# Patient Record
Sex: FEMALE | Race: OTHER | Employment: FULL TIME | URBAN - METROPOLITAN AREA
[De-identification: names, ages, dates, MRNs, and addresses within clinical notes are randomized per-mention and may not be internally consistent; named-entity substitution may affect disease eponyms.]

---

## 2018-04-26 ENCOUNTER — HOSPITAL ENCOUNTER (EMERGENCY)
Age: 23
Discharge: HOME | End: 2018-04-26
Payer: COMMERCIAL

## 2018-04-26 ENCOUNTER — APPOINTMENT (OUTPATIENT)
Dept: GENERAL RADIOLOGY | Age: 23
End: 2018-04-26
Payer: COMMERCIAL

## 2018-04-26 VITALS
OXYGEN SATURATION: 99 % | TEMPERATURE: 97.7 F | RESPIRATION RATE: 18 BRPM | HEART RATE: 66 BPM | DIASTOLIC BLOOD PRESSURE: 71 MMHG | WEIGHT: 175 LBS | SYSTOLIC BLOOD PRESSURE: 139 MMHG

## 2018-04-26 DIAGNOSIS — S02.2XXA CLOSED FRACTURE OF NASAL BONE, INITIAL ENCOUNTER: Primary | ICD-10-CM

## 2018-04-26 LAB
CHP ED QC CHECK: NORMAL
PREGNANCY TEST URINE, POC: NEGATIVE

## 2018-04-26 PROCEDURE — 99283 EMERGENCY DEPT VISIT LOW MDM: CPT

## 2018-04-26 PROCEDURE — 70160 X-RAY EXAM OF NASAL BONES: CPT

## 2018-04-26 ASSESSMENT — ENCOUNTER SYMPTOMS
BLOOD IN STOOL: 0
VOMITING: 0
DIARRHEA: 0
RHINORRHEA: 0
SHORTNESS OF BREATH: 0
NAUSEA: 0
COUGH: 0
ABDOMINAL PAIN: 0
SORE THROAT: 0
COLOR CHANGE: 0

## 2018-04-26 ASSESSMENT — PAIN SCALES - GENERAL: PAINLEVEL_OUTOF10: 7

## 2018-04-26 ASSESSMENT — PAIN DESCRIPTION - LOCATION: LOCATION: NOSE

## 2018-04-26 ASSESSMENT — PAIN DESCRIPTION - PAIN TYPE: TYPE: ACUTE PAIN

## 2018-04-26 ASSESSMENT — PAIN DESCRIPTION - DESCRIPTORS: DESCRIPTORS: ACHING;SORE

## 2018-04-26 ASSESSMENT — PAIN DESCRIPTION - FREQUENCY: FREQUENCY: INTERMITTENT

## 2019-08-04 ENCOUNTER — APPOINTMENT (OUTPATIENT)
Dept: CT IMAGING | Age: 24
End: 2019-08-04
Payer: COMMERCIAL

## 2019-08-04 ENCOUNTER — HOSPITAL ENCOUNTER (EMERGENCY)
Age: 24
Discharge: HOME | End: 2019-08-04
Payer: COMMERCIAL

## 2019-08-04 VITALS
RESPIRATION RATE: 16 BRPM | WEIGHT: 170 LBS | HEART RATE: 74 BPM | BODY MASS INDEX: 36.68 KG/M2 | TEMPERATURE: 97.9 F | OXYGEN SATURATION: 100 % | HEIGHT: 57 IN | DIASTOLIC BLOOD PRESSURE: 60 MMHG | SYSTOLIC BLOOD PRESSURE: 123 MMHG

## 2019-08-04 DIAGNOSIS — L03.316 CELLULITIS OF UMBILICUS: ICD-10-CM

## 2019-08-04 DIAGNOSIS — K80.50 BILIARY COLIC: Primary | ICD-10-CM

## 2019-08-04 LAB
ALBUMIN SERPL-MCNC: 4.1 G/DL (ref 3.5–4.6)
ALP BLD-CCNC: 75 U/L (ref 40–130)
ALT SERPL-CCNC: 10 U/L (ref 0–33)
ANION GAP SERPL CALCULATED.3IONS-SCNC: 15 MEQ/L (ref 9–15)
AST SERPL-CCNC: 13 U/L (ref 0–35)
BACTERIA: ABNORMAL /HPF
BASOPHILS ABSOLUTE: 0.1 K/UL (ref 0–0.2)
BASOPHILS RELATIVE PERCENT: 1.1 %
BILIRUB SERPL-MCNC: 0.4 MG/DL (ref 0.2–0.7)
BILIRUBIN URINE: NEGATIVE
BLOOD, URINE: NEGATIVE
BUN BLDV-MCNC: 9 MG/DL (ref 6–20)
CALCIUM SERPL-MCNC: 8.8 MG/DL (ref 8.5–9.9)
CHLORIDE BLD-SCNC: 102 MEQ/L (ref 95–107)
CHP ED QC CHECK: NORMAL
CLARITY: ABNORMAL
CO2: 23 MEQ/L (ref 20–31)
COLOR: YELLOW
CREAT SERPL-MCNC: 0.65 MG/DL (ref 0.5–0.9)
EOSINOPHILS ABSOLUTE: 0.4 K/UL (ref 0–0.7)
EOSINOPHILS RELATIVE PERCENT: 4.8 %
EPITHELIAL CELLS, UA: >100 /HPF (ref 0–5)
GFR AFRICAN AMERICAN: >60
GFR NON-AFRICAN AMERICAN: >60
GLOBULIN: 2.8 G/DL (ref 2.3–3.5)
GLUCOSE BLD-MCNC: 103 MG/DL (ref 70–99)
GLUCOSE URINE: NEGATIVE MG/DL
HCT VFR BLD CALC: 40.7 % (ref 37–47)
HEMOGLOBIN: 14.2 G/DL (ref 12–16)
KETONES, URINE: NEGATIVE MG/DL
LACTIC ACID: 1 MMOL/L (ref 0.5–2.2)
LEUKOCYTE ESTERASE, URINE: ABNORMAL
LIPASE: 23 U/L (ref 12–95)
LYMPHOCYTES ABSOLUTE: 2 K/UL (ref 1–4.8)
LYMPHOCYTES RELATIVE PERCENT: 25.7 %
MCH RBC QN AUTO: 30.3 PG (ref 27–31.3)
MCHC RBC AUTO-ENTMCNC: 35 % (ref 33–37)
MCV RBC AUTO: 86.5 FL (ref 82–100)
MONOCYTES ABSOLUTE: 0.5 K/UL (ref 0.2–0.8)
MONOCYTES RELATIVE PERCENT: 6.1 %
NEUTROPHILS ABSOLUTE: 4.8 K/UL (ref 1.4–6.5)
NEUTROPHILS RELATIVE PERCENT: 62.3 %
NITRITE, URINE: NEGATIVE
PDW BLD-RTO: 12.8 % (ref 11.5–14.5)
PH UA: 5.5 (ref 5–9)
PLATELET # BLD: 224 K/UL (ref 130–400)
POC CREATININE WHOLE BLOOD: 0.7
POTASSIUM SERPL-SCNC: 3.9 MEQ/L (ref 3.4–4.9)
PREGNANCY TEST URINE, POC: NORMAL
PROTEIN UA: NEGATIVE MG/DL
RBC # BLD: 4.7 M/UL (ref 4.2–5.4)
SODIUM BLD-SCNC: 140 MEQ/L (ref 135–144)
SPECIFIC GRAVITY UA: 1.03 (ref 1–1.03)
TOTAL PROTEIN: 6.9 G/DL (ref 6.3–8)
URINE REFLEX TO CULTURE: YES
UROBILINOGEN, URINE: 1 E.U./DL
WBC # BLD: 7.6 K/UL (ref 4.8–10.8)
WBC UA: ABNORMAL /HPF (ref 0–5)

## 2019-08-04 PROCEDURE — 6360000004 HC RX CONTRAST MEDICATION: Performed by: PHYSICIAN ASSISTANT

## 2019-08-04 PROCEDURE — 87205 SMEAR GRAM STAIN: CPT

## 2019-08-04 PROCEDURE — 87086 URINE CULTURE/COLONY COUNT: CPT

## 2019-08-04 PROCEDURE — 74177 CT ABD & PELVIS W/CONTRAST: CPT

## 2019-08-04 PROCEDURE — 81001 URINALYSIS AUTO W/SCOPE: CPT

## 2019-08-04 PROCEDURE — 83690 ASSAY OF LIPASE: CPT

## 2019-08-04 PROCEDURE — 87070 CULTURE OTHR SPECIMN AEROBIC: CPT

## 2019-08-04 PROCEDURE — 83605 ASSAY OF LACTIC ACID: CPT

## 2019-08-04 PROCEDURE — 87185 SC STD ENZYME DETCJ PER NZM: CPT

## 2019-08-04 PROCEDURE — 85025 COMPLETE CBC W/AUTO DIFF WBC: CPT

## 2019-08-04 PROCEDURE — 36415 COLL VENOUS BLD VENIPUNCTURE: CPT

## 2019-08-04 PROCEDURE — 6360000002 HC RX W HCPCS: Performed by: PHYSICIAN ASSISTANT

## 2019-08-04 PROCEDURE — 87077 CULTURE AEROBIC IDENTIFY: CPT

## 2019-08-04 PROCEDURE — 99284 EMERGENCY DEPT VISIT MOD MDM: CPT

## 2019-08-04 PROCEDURE — 80053 COMPREHEN METABOLIC PANEL: CPT

## 2019-08-04 PROCEDURE — 96374 THER/PROPH/DIAG INJ IV PUSH: CPT

## 2019-08-04 PROCEDURE — 2580000003 HC RX 258: Performed by: PHYSICIAN ASSISTANT

## 2019-08-04 PROCEDURE — 87075 CULTR BACTERIA EXCEPT BLOOD: CPT

## 2019-08-04 PROCEDURE — 96375 TX/PRO/DX INJ NEW DRUG ADDON: CPT

## 2019-08-04 RX ORDER — KETOROLAC TROMETHAMINE 30 MG/ML
30 INJECTION, SOLUTION INTRAMUSCULAR; INTRAVENOUS ONCE
Status: COMPLETED | OUTPATIENT
Start: 2019-08-04 | End: 2019-08-04

## 2019-08-04 RX ORDER — DICYCLOMINE HYDROCHLORIDE 10 MG/1
10 CAPSULE ORAL EVERY 6 HOURS PRN
Qty: 20 CAPSULE | Refills: 0 | Status: SHIPPED | OUTPATIENT
Start: 2019-08-04 | End: 2019-08-28 | Stop reason: ALTCHOICE

## 2019-08-04 RX ORDER — ONDANSETRON 4 MG/1
4 TABLET, ORALLY DISINTEGRATING ORAL EVERY 8 HOURS PRN
Qty: 20 TABLET | Refills: 0 | Status: SHIPPED | OUTPATIENT
Start: 2019-08-04 | End: 2019-08-28 | Stop reason: ALTCHOICE

## 2019-08-04 RX ORDER — GINSENG 100 MG
CAPSULE ORAL
Qty: 1 TUBE | Refills: 0 | Status: SHIPPED | OUTPATIENT
Start: 2019-08-04 | End: 2019-08-28 | Stop reason: ALTCHOICE

## 2019-08-04 RX ORDER — 0.9 % SODIUM CHLORIDE 0.9 %
1000 INTRAVENOUS SOLUTION INTRAVENOUS ONCE
Status: COMPLETED | OUTPATIENT
Start: 2019-08-04 | End: 2019-08-04

## 2019-08-04 RX ORDER — ONDANSETRON 2 MG/ML
4 INJECTION INTRAMUSCULAR; INTRAVENOUS ONCE
Status: COMPLETED | OUTPATIENT
Start: 2019-08-04 | End: 2019-08-04

## 2019-08-04 RX ADMIN — SODIUM CHLORIDE 1000 ML: 9 INJECTION, SOLUTION INTRAVENOUS at 18:45

## 2019-08-04 RX ADMIN — IOPAMIDOL 100 ML: 612 INJECTION, SOLUTION INTRAVENOUS at 19:10

## 2019-08-04 RX ADMIN — KETOROLAC TROMETHAMINE 30 MG: 30 INJECTION, SOLUTION INTRAMUSCULAR at 18:45

## 2019-08-04 RX ADMIN — ONDANSETRON 4 MG: 2 INJECTION INTRAMUSCULAR; INTRAVENOUS at 18:45

## 2019-08-04 ASSESSMENT — ENCOUNTER SYMPTOMS
COLOR CHANGE: 0
SHORTNESS OF BREATH: 0
VOMITING: 0
ALLERGIC/IMMUNOLOGIC NEGATIVE: 1
NAUSEA: 1
EYE PAIN: 0
DIARRHEA: 0
APNEA: 0
ABDOMINAL PAIN: 1
TROUBLE SWALLOWING: 0

## 2019-08-04 ASSESSMENT — PAIN SCALES - GENERAL
PAINLEVEL_OUTOF10: 2
PAINLEVEL_OUTOF10: 0

## 2019-08-04 NOTE — ED PROVIDER NOTES
TO:  Price Thomas MD  [address] [facility], [address] [facility],[address]  [address] [place] [address]  [phone]    Call in 2 days      Kimball Brittle, MD  [address] [facility]   [facility] [id number] [phone]    Call   As needed      DISCHARGE MEDICATIONS:  New Prescriptions    BACITRACIN 500 UNIT/GM OINTMENT    Apply topically 2 times daily to umbilicus    DICYCLOMINE (BENTYL) 10 MG CAPSULE    Take 1 capsule by mouth every 6 hours as needed (abdominal pain)    ONDANSETRON (ZOFRAN ODT) 4 MG DISINTEGRATING TABLET    Take 1 tablet by mouth every 8 hours as needed for Nausea       (Please note that portions of this note were completed with a voice recognition program.  Efforts were made to edit the dictations but occasionally words are mis-transcribed.)    ABHINAV Lawson PA-C  08/04/19 1951

## 2019-08-04 NOTE — ED NOTES
Beverli Bamberger PA at bedside. Patient provided urine sample.       Addi Strauss RN  08/04/19 [id number]

## 2019-08-04 NOTE — ED NOTES
IV started. Bloodwork, urine, and wound culture of umbilical area sent to lab. Patient medicated per MAR.       Dean Clark RN  08/04/19 5239

## 2019-08-06 LAB
ANAEROBIC CULTURE: ABNORMAL
GFR AFRICAN AMERICAN: >60
GFR NON-AFRICAN AMERICAN: >60
GRAM STAIN RESULT: ABNORMAL
ORGANISM: ABNORMAL
ORGANISM: ABNORMAL
PERFORMED ON: NORMAL
POC CREATININE: 0.7 MG/DL (ref 0.6–1.1)
POC SAMPLE TYPE: NORMAL
URINE CULTURE, ROUTINE: NORMAL
WOUND/ABSCESS: ABNORMAL
WOUND/ABSCESS: ABNORMAL

## 2019-08-28 ENCOUNTER — HOSPITAL ENCOUNTER (EMERGENCY)
Age: 24
Discharge: HOME | End: 2019-08-28
Attending: EMERGENCY MEDICINE
Payer: COMMERCIAL

## 2019-08-28 ENCOUNTER — APPOINTMENT (OUTPATIENT)
Dept: CT IMAGING | Age: 24
End: 2019-08-28
Payer: COMMERCIAL

## 2019-08-28 VITALS
OXYGEN SATURATION: 99 % | RESPIRATION RATE: 16 BRPM | HEART RATE: 61 BPM | TEMPERATURE: 98.8 F | DIASTOLIC BLOOD PRESSURE: 56 MMHG | SYSTOLIC BLOOD PRESSURE: 110 MMHG

## 2019-08-28 DIAGNOSIS — R51.9 INTRACTABLE EPISODIC HEADACHE, UNSPECIFIED HEADACHE TYPE: Primary | ICD-10-CM

## 2019-08-28 PROCEDURE — 96372 THER/PROPH/DIAG INJ SC/IM: CPT

## 2019-08-28 PROCEDURE — 6360000002 HC RX W HCPCS: Performed by: EMERGENCY MEDICINE

## 2019-08-28 PROCEDURE — 99284 EMERGENCY DEPT VISIT MOD MDM: CPT

## 2019-08-28 PROCEDURE — 70450 CT HEAD/BRAIN W/O DYE: CPT

## 2019-08-28 RX ORDER — KETOROLAC TROMETHAMINE 30 MG/ML
30 INJECTION, SOLUTION INTRAMUSCULAR; INTRAVENOUS ONCE
Status: DISCONTINUED | OUTPATIENT
Start: 2019-08-28 | End: 2019-08-28

## 2019-08-28 RX ORDER — KETOROLAC TROMETHAMINE 30 MG/ML
60 INJECTION, SOLUTION INTRAMUSCULAR; INTRAVENOUS ONCE
Status: COMPLETED | OUTPATIENT
Start: 2019-08-28 | End: 2019-08-28

## 2019-08-28 RX ORDER — MORPHINE SULFATE 2 MG/ML
4 INJECTION, SOLUTION INTRAMUSCULAR; INTRAVENOUS ONCE
Status: DISCONTINUED | OUTPATIENT
Start: 2019-08-28 | End: 2019-08-28

## 2019-08-28 RX ORDER — TRAMADOL HYDROCHLORIDE 50 MG/1
50 TABLET ORAL EVERY 6 HOURS PRN
Qty: 12 TABLET | Refills: 0 | Status: SHIPPED | OUTPATIENT
Start: 2019-08-28 | End: 2019-08-31

## 2019-08-28 RX ORDER — ONDANSETRON 2 MG/ML
4 INJECTION INTRAMUSCULAR; INTRAVENOUS ONCE
Status: DISCONTINUED | OUTPATIENT
Start: 2019-08-28 | End: 2019-08-28

## 2019-08-28 RX ORDER — 0.9 % SODIUM CHLORIDE 0.9 %
1000 INTRAVENOUS SOLUTION INTRAVENOUS ONCE
Status: DISCONTINUED | OUTPATIENT
Start: 2019-08-28 | End: 2019-08-28

## 2019-08-28 RX ORDER — MORPHINE SULFATE 2 MG/ML
4 INJECTION, SOLUTION INTRAMUSCULAR; INTRAVENOUS ONCE
Status: COMPLETED | OUTPATIENT
Start: 2019-08-28 | End: 2019-08-28

## 2019-08-28 RX ADMIN — MORPHINE SULFATE 4 MG: 2 INJECTION, SOLUTION INTRAMUSCULAR; INTRAVENOUS at 21:54

## 2019-08-28 RX ADMIN — KETOROLAC TROMETHAMINE 60 MG: 30 INJECTION, SOLUTION INTRAMUSCULAR at 21:58

## 2019-08-28 ASSESSMENT — ENCOUNTER SYMPTOMS
PHOTOPHOBIA: 0
CHEST TIGHTNESS: 0
WHEEZING: 0
EYE DISCHARGE: 0
ABDOMINAL DISTENTION: 0
ABDOMINAL PAIN: 0
SHORTNESS OF BREATH: 0
VOMITING: 0
COUGH: 0
SORE THROAT: 0

## 2019-08-28 ASSESSMENT — PAIN DESCRIPTION - ORIENTATION: ORIENTATION: LEFT

## 2019-08-28 ASSESSMENT — PAIN DESCRIPTION - FREQUENCY: FREQUENCY: INTERMITTENT

## 2019-08-28 ASSESSMENT — PAIN SCALES - GENERAL
PAINLEVEL_OUTOF10: 9
PAINLEVEL_OUTOF10: 0

## 2019-08-28 ASSESSMENT — PAIN DESCRIPTION - LOCATION: LOCATION: HEAD

## 2019-08-28 ASSESSMENT — PAIN DESCRIPTION - PAIN TYPE: TYPE: ACUTE PAIN

## 2019-08-29 NOTE — ED TRIAGE NOTES
Patient ambulatory to ED for c/o pain Lt side of head and numbness Lt. Arm. Denies injury. Was seen at [facility] ED and discharged early a.m. For the same. Patient is alert and oriented x3. Skin pink, warm, dry. PERRLA.

## 2020-01-09 ENCOUNTER — APPOINTMENT (OUTPATIENT)
Dept: GENERAL RADIOLOGY | Age: 25
End: 2020-01-09
Payer: MEDICAID

## 2020-01-09 ENCOUNTER — HOSPITAL ENCOUNTER (EMERGENCY)
Age: 25
Discharge: HOME | End: 2020-01-09
Attending: STUDENT IN AN ORGANIZED HEALTH CARE EDUCATION/TRAINING PROGRAM
Payer: MEDICAID

## 2020-01-09 VITALS
OXYGEN SATURATION: 99 % | WEIGHT: 160 LBS | RESPIRATION RATE: 16 BRPM | BODY MASS INDEX: 34.52 KG/M2 | SYSTOLIC BLOOD PRESSURE: 129 MMHG | HEART RATE: 94 BPM | HEIGHT: 57 IN | DIASTOLIC BLOOD PRESSURE: 82 MMHG | TEMPERATURE: 97.9 F

## 2020-01-09 LAB
ALBUMIN SERPL-MCNC: 4.5 G/DL (ref 3.5–4.6)
ALP BLD-CCNC: 73 U/L (ref 40–130)
ALT SERPL-CCNC: 20 U/L (ref 0–33)
AMPHETAMINE SCREEN, URINE: NORMAL
ANION GAP SERPL CALCULATED.3IONS-SCNC: 15 MEQ/L (ref 9–15)
AST SERPL-CCNC: 17 U/L (ref 0–35)
BACTERIA: ABNORMAL /HPF
BARBITURATE SCREEN URINE: NORMAL
BASOPHILS ABSOLUTE: 0 K/UL (ref 0–0.2)
BASOPHILS RELATIVE PERCENT: 0.1 %
BENZODIAZEPINE SCREEN, URINE: NORMAL
BILIRUB SERPL-MCNC: 0.9 MG/DL (ref 0.2–0.7)
BILIRUBIN URINE: NEGATIVE
BLOOD, URINE: ABNORMAL
BUN BLDV-MCNC: 13 MG/DL (ref 6–20)
CALCIUM SERPL-MCNC: 9.6 MG/DL (ref 8.5–9.9)
CANNABINOID SCREEN URINE: NORMAL
CHLORIDE BLD-SCNC: 103 MEQ/L (ref 95–107)
CLARITY: ABNORMAL
CO2: 21 MEQ/L (ref 20–31)
COCAINE METABOLITE SCREEN URINE: NORMAL
COLOR: ABNORMAL
CREAT SERPL-MCNC: 0.66 MG/DL (ref 0.5–0.9)
EKG ATRIAL RATE: 98 BPM
EKG P AXIS: 3 DEGREES
EKG P-R INTERVAL: 112 MS
EKG Q-T INTERVAL: 328 MS
EKG QRS DURATION: 82 MS
EKG QTC CALCULATION (BAZETT): 418 MS
EKG R AXIS: 43 DEGREES
EKG T AXIS: -7 DEGREES
EKG VENTRICULAR RATE: 98 BPM
EOSINOPHILS ABSOLUTE: 0 K/UL (ref 0–0.7)
EOSINOPHILS RELATIVE PERCENT: 0.3 %
EPITHELIAL CELLS, UA: ABNORMAL /HPF (ref 0–5)
GFR AFRICAN AMERICAN: >60
GFR NON-AFRICAN AMERICAN: >60
GLOBULIN: 3.5 G/DL (ref 2.3–3.5)
GLUCOSE BLD-MCNC: 113 MG/DL (ref 70–99)
GLUCOSE URINE: NEGATIVE MG/DL
HCG, URINE, POC: NEGATIVE
HCT VFR BLD CALC: 46.8 % (ref 37–47)
HEMOGLOBIN: 15.8 G/DL (ref 12–16)
HYALINE CASTS: ABNORMAL /HPF (ref 0–5)
INFLUENZA A BY PCR: NEGATIVE
INFLUENZA B BY PCR: NEGATIVE
KETONES, URINE: ABNORMAL MG/DL
LEUKOCYTE ESTERASE, URINE: ABNORMAL
LYMPHOCYTES ABSOLUTE: 0.3 K/UL (ref 1–4.8)
LYMPHOCYTES RELATIVE PERCENT: 2.3 %
Lab: NORMAL
Lab: NORMAL
MCH RBC QN AUTO: 29.6 PG (ref 27–31.3)
MCHC RBC AUTO-ENTMCNC: 33.7 % (ref 33–37)
MCV RBC AUTO: 87.7 FL (ref 82–100)
METHADONE SCREEN, URINE: NORMAL
MONOCYTES ABSOLUTE: 0.3 K/UL (ref 0.2–0.8)
MONOCYTES RELATIVE PERCENT: 2.2 %
NEGATIVE QC PASS/FAIL: NORMAL
NEUTROPHILS ABSOLUTE: 12.2 K/UL (ref 1.4–6.5)
NEUTROPHILS RELATIVE PERCENT: 95.1 %
NITRITE, URINE: NEGATIVE
OPIATE SCREEN URINE: NORMAL
OXYCODONE URINE: NORMAL
PDW BLD-RTO: 12.8 % (ref 11.5–14.5)
PH UA: 5 (ref 5–9)
PHENCYCLIDINE SCREEN URINE: NORMAL
PLATELET # BLD: 237 K/UL (ref 130–400)
POSITIVE QC PASS/FAIL: NORMAL
POTASSIUM SERPL-SCNC: 4.2 MEQ/L (ref 3.4–4.9)
PROPOXYPHENE SCREEN: NORMAL
PROTEIN UA: ABNORMAL MG/DL
RBC # BLD: 5.34 M/UL (ref 4.2–5.4)
RBC UA: ABNORMAL /HPF (ref 0–5)
SODIUM BLD-SCNC: 139 MEQ/L (ref 135–144)
SPECIFIC GRAVITY UA: 1.03 (ref 1–1.03)
STREP GRP A PCR: NEGATIVE
TOTAL PROTEIN: 8 G/DL (ref 6.3–8)
TROPONIN: <0.01 NG/ML (ref 0–0.01)
URINE REFLEX TO CULTURE: YES
UROBILINOGEN, URINE: 0.2 E.U./DL
WBC # BLD: 12.8 K/UL (ref 4.8–10.8)
WBC UA: ABNORMAL /HPF (ref 0–5)

## 2020-01-09 PROCEDURE — 84484 ASSAY OF TROPONIN QUANT: CPT

## 2020-01-09 PROCEDURE — 71046 X-RAY EXAM CHEST 2 VIEWS: CPT

## 2020-01-09 PROCEDURE — 87086 URINE CULTURE/COLONY COUNT: CPT

## 2020-01-09 PROCEDURE — 6360000002 HC RX W HCPCS: Performed by: STUDENT IN AN ORGANIZED HEALTH CARE EDUCATION/TRAINING PROGRAM

## 2020-01-09 PROCEDURE — 96361 HYDRATE IV INFUSION ADD-ON: CPT

## 2020-01-09 PROCEDURE — 85025 COMPLETE CBC W/AUTO DIFF WBC: CPT

## 2020-01-09 PROCEDURE — 93005 ELECTROCARDIOGRAM TRACING: CPT | Performed by: STUDENT IN AN ORGANIZED HEALTH CARE EDUCATION/TRAINING PROGRAM

## 2020-01-09 PROCEDURE — 80307 DRUG TEST PRSMV CHEM ANLYZR: CPT

## 2020-01-09 PROCEDURE — 81001 URINALYSIS AUTO W/SCOPE: CPT

## 2020-01-09 PROCEDURE — 2580000003 HC RX 258: Performed by: STUDENT IN AN ORGANIZED HEALTH CARE EDUCATION/TRAINING PROGRAM

## 2020-01-09 PROCEDURE — 96374 THER/PROPH/DIAG INJ IV PUSH: CPT

## 2020-01-09 PROCEDURE — 99284 EMERGENCY DEPT VISIT MOD MDM: CPT

## 2020-01-09 PROCEDURE — 80053 COMPREHEN METABOLIC PANEL: CPT

## 2020-01-09 PROCEDURE — 87502 INFLUENZA DNA AMP PROBE: CPT

## 2020-01-09 PROCEDURE — 36415 COLL VENOUS BLD VENIPUNCTURE: CPT

## 2020-01-09 PROCEDURE — 87651 STREP A DNA AMP PROBE: CPT

## 2020-01-09 RX ORDER — ONDANSETRON 2 MG/ML
4 INJECTION INTRAMUSCULAR; INTRAVENOUS ONCE
Status: COMPLETED | OUTPATIENT
Start: 2020-01-09 | End: 2020-01-09

## 2020-01-09 RX ORDER — 0.9 % SODIUM CHLORIDE 0.9 %
1000 INTRAVENOUS SOLUTION INTRAVENOUS ONCE
Status: COMPLETED | OUTPATIENT
Start: 2020-01-09 | End: 2020-01-09

## 2020-01-09 RX ORDER — FAMOTIDINE 20 MG/1
20 TABLET, FILM COATED ORAL 2 TIMES DAILY
Qty: 60 TABLET | Refills: 0 | Status: SHIPPED | OUTPATIENT
Start: 2020-01-09 | End: 2021-03-08

## 2020-01-09 RX ORDER — ONDANSETRON 4 MG/1
4 TABLET, ORALLY DISINTEGRATING ORAL EVERY 8 HOURS PRN
Qty: 7 TABLET | Refills: 0 | Status: SHIPPED | OUTPATIENT
Start: 2020-01-09 | End: 2021-03-08

## 2020-01-09 RX ADMIN — ONDANSETRON 4 MG: 2 INJECTION INTRAMUSCULAR; INTRAVENOUS at 15:29

## 2020-01-09 RX ADMIN — SODIUM CHLORIDE 1000 ML: 9 INJECTION, SOLUTION INTRAVENOUS at 15:29

## 2020-01-09 ASSESSMENT — ENCOUNTER SYMPTOMS
COUGH: 0
TROUBLE SWALLOWING: 0
SHORTNESS OF BREATH: 0
NAUSEA: 1
ABDOMINAL PAIN: 1
SINUS PRESSURE: 0
DIARRHEA: 1
CHEST TIGHTNESS: 0
BACK PAIN: 0
VOMITING: 1

## 2020-01-09 ASSESSMENT — PAIN SCALES - GENERAL: PAINLEVEL_OUTOF10: 7

## 2020-01-09 NOTE — ED TRIAGE NOTES
Pt arrives with mother to er with c/o  N/v/d.  Pt  Also  Passed out for a few minutes  Per her mother and was having  Seizure like activity. Mother states that although the passing out d/t  Heaving so hard is not  New  But the  Seizure like activity was new and  Concerned her   Enough to bring her  Daughter in.  Pt alert, oriented and ambulatory on arrival

## 2020-01-09 NOTE — ED PROVIDER NOTES
Gastrointestinal: Positive for abdominal pain, diarrhea, nausea and vomiting. Endocrine: Negative for polydipsia and polyphagia. Genitourinary: Negative for dysuria, flank pain and frequency. Musculoskeletal: Negative for back pain and myalgias. Skin: Negative for pallor and rash. Neurological: Positive for syncope. Negative for weakness and headaches. Hematological: Does not bruise/bleed easily. All other systems reviewed and are negative. Except as noted above the remainder of the review of systems was reviewed and negative. PAST MEDICAL HISTORY     Past Medical History:   Diagnosis Date    Anxiety     Manic depression ([facility] [address]          SURGICALHISTORY     No past surgical history on file. CURRENT MEDICATIONS       Previous Medications    No medications on file       ALLERGIES     Other    FAMILY HISTORY     No family history on file.        SOCIAL HISTORY       Social History     Socioeconomic History    Marital status: Single     Spouse name: Not on file    Number of children: Not on file    Years of education: Not on file    Highest education level: Not on file   Occupational History    Not on file   Social Needs    Financial resource strain: Not on file    Food insecurity:     Worry: Not on file     Inability: Not on file    Transportation needs:     Medical: Not on file     Non-medical: Not on file   Tobacco Use    Smoking status: Former Smoker    Smokeless tobacco: Never Used    Tobacco comment: quit 1 month ago   Substance and Sexual Activity    Alcohol use: Yes     Comment: socially    Drug use: No    Sexual activity: Not on file   Lifestyle    Physical activity:     Days per week: Not on file     Minutes per session: Not on file    Stress: Not on file   Relationships    Social connections:     Talks on phone: Not on file     Gets together: Not on file     Attends Adventism service: Not on file     Active member of club or organization: Not on file     Attends meetings of clubs or organizations: Not on file     Relationship status: Not on file    Intimate partner violence:     Fear of current or ex partner: Not on file     Emotionally abused: Not on file     Physically abused: Not on file     Forced sexual activity: Not on file   Other Topics Concern    Not on file   Social History Narrative    ** Merged History Encounter **            SCREENINGS    Weidman Coma Scale  Eye Opening: Spontaneous  Best Verbal Response: Oriented  Best Motor Response: Obeys commands  Weidman Coma Scale Score: 15 @FLOW([id number])@      PHYSICAL EXAM    (up to 7 for level 4, 8 or more for level 5)     ED Triage Vitals [01/09/20 1416]   BP Temp Temp Source Pulse Resp SpO2 Height Weight   114/78 97.9 °F (36.6 °C) Oral 109 18 98 % 4' 9\" (1.448 m) 160 lb (72.6 kg)       Physical Exam  Vitals signs and nursing note reviewed. Constitutional:       General: She is awake. She is not in acute distress. Appearance: Normal appearance. She is well-developed, well-groomed and overweight. She is not ill-appearing, toxic-appearing or diaphoretic. Comments: No photophobia. No phonophobia. HENT:      Head: Normocephalic and atraumatic. No Costa's sign. Right Ear: External ear normal.      Left Ear: External ear normal.      Nose: Nose normal.      Mouth/Throat:      Mouth: Mucous membranes are dry. Pharynx: Oropharynx is clear. No oropharyngeal exudate or posterior oropharyngeal erythema. Eyes:      General: No scleral icterus. Right eye: No foreign body. Conjunctiva/sclera: Conjunctivae normal.      Left eye: No exudate. Pupils: Pupils are equal, round, and reactive to light. Neck:      Musculoskeletal: Normal range of motion and neck supple. No neck rigidity. Vascular: No JVD. Trachea: No tracheal deviation. Comments: No meningismus. Cardiovascular:      Rate and Rhythm: Regular rhythm. Tachycardia present. No extrasystoles are present.      Chest normal.         DIAGNOSTIC RESULTS     EKG: All EKG's are interpreted by the Emergency Department Physician who either signs or Co-signsthis chart in the absence of a cardiologist.    EKG: Normal sinus rhythm at 98 bpm, inverted T wave in III and aVF. There is a Q wave in III. The QT interval is 328 ms. The axis is normal.  There is good R wave transition the precordial leads. There are no PVCs. RADIOLOGY:   Non-plain filmimages such as CT, Ultrasound and MRI are read by the radiologist. Plain radiographic images are visualized and preliminarily interpreted by the emergency physician with the below findings:        Interpretation per the Radiologist below, if available at the time ofthis note:    XR CHEST STANDARD (2 VW)   Final Result      No acute radiographic abnormality.                   ED BEDSIDE ULTRASOUND:   Performed by ED Physician - none    LABS:  Labs Reviewed   CBC WITH AUTO DIFFERENTIAL - Abnormal; Notable for the following components:       Result Value    WBC 12.8 (*)     Neutrophils Absolute 12.2 (*)     Lymphocytes Absolute 0.3 (*)     All other components within normal limits   COMPREHENSIVE METABOLIC PANEL - Abnormal; Notable for the following components:    Glucose 113 (*)     Total Bilirubin 0.9 (*)     All other components within normal limits   URINE RT REFLEX TO CULTURE - Abnormal; Notable for the following components:    Color, UA DARK YELLOW (*)     Clarity, UA CLOUDY (*)     Ketones, Urine TRACE (*)     Blood, Urine LARGE (*)     Protein, UA TRACE (*)     Leukocyte Esterase, Urine TRACE (*)     All other components within normal limits   MICROSCOPIC URINALYSIS - Abnormal; Notable for the following components:    Bacteria, UA RARE (*)     RBC, UA 6-10 (*)     All other components within normal limits   RAPID STREP SCREEN   RAPID INFLUENZA A/B ANTIGENS   URINE CULTURE   URINE DRUG SCREEN   TROPONIN   POC PREGNANCY UR-QUAL       All other labs were within normal range or not returned completed with a voice recognition program.  Efforts were made to edit the dictations but occasionally words are mis-transcribed.)    Ji Lainez DO (electronically signed)  Attending Emergency Physician          Ji Lainez DO  01/09/20 2869

## 2020-01-10 PROCEDURE — 93010 ELECTROCARDIOGRAM REPORT: CPT | Performed by: INTERNAL MEDICINE

## 2020-01-11 LAB — URINE CULTURE, ROUTINE: NORMAL

## 2020-06-27 ENCOUNTER — HOSPITAL ENCOUNTER (EMERGENCY)
Age: 25
Discharge: HOME | End: 2020-06-28
Payer: COMMERCIAL

## 2020-06-27 LAB
ALBUMIN SERPL-MCNC: 4.1 G/DL (ref 3.5–4.6)
ALP BLD-CCNC: 60 U/L (ref 40–130)
ALT SERPL-CCNC: 6 U/L (ref 0–33)
ANION GAP SERPL CALCULATED.3IONS-SCNC: 15 MEQ/L (ref 9–15)
APTT: 25.3 SEC (ref 24.4–36.8)
AST SERPL-CCNC: 10 U/L (ref 0–35)
BASOPHILS ABSOLUTE: 0.1 K/UL (ref 0–0.2)
BASOPHILS RELATIVE PERCENT: 0.9 %
BILIRUB SERPL-MCNC: 0.4 MG/DL (ref 0.2–0.7)
BUN BLDV-MCNC: 11 MG/DL (ref 6–20)
CALCIUM SERPL-MCNC: 9.6 MG/DL (ref 8.5–9.9)
CHLORIDE BLD-SCNC: 103 MEQ/L (ref 95–107)
CO2: 21 MEQ/L (ref 20–31)
CREAT SERPL-MCNC: 0.71 MG/DL (ref 0.5–0.9)
EOSINOPHILS ABSOLUTE: 0.3 K/UL (ref 0–0.7)
EOSINOPHILS RELATIVE PERCENT: 4.3 %
GFR AFRICAN AMERICAN: >60
GFR NON-AFRICAN AMERICAN: >60
GLOBULIN: 3.1 G/DL (ref 2.3–3.5)
GLUCOSE BLD-MCNC: 124 MG/DL (ref 70–99)
HCT VFR BLD CALC: 43 % (ref 37–47)
HEMOGLOBIN: 14.7 G/DL (ref 12–16)
INR BLD: 0.9
LACTIC ACID: 1.9 MMOL/L (ref 0.5–2.2)
LYMPHOCYTES ABSOLUTE: 2 K/UL (ref 1–4.8)
LYMPHOCYTES RELATIVE PERCENT: 25.6 %
MCH RBC QN AUTO: 30.4 PG (ref 27–31.3)
MCHC RBC AUTO-ENTMCNC: 34.3 % (ref 33–37)
MCV RBC AUTO: 88.5 FL (ref 82–100)
MONOCYTES ABSOLUTE: 0.5 K/UL (ref 0.2–0.8)
MONOCYTES RELATIVE PERCENT: 6.3 %
NEUTROPHILS ABSOLUTE: 5 K/UL (ref 1.4–6.5)
NEUTROPHILS RELATIVE PERCENT: 62.9 %
PDW BLD-RTO: 13 % (ref 11.5–14.5)
PLATELET # BLD: 224 K/UL (ref 130–400)
POTASSIUM SERPL-SCNC: 3.4 MEQ/L (ref 3.4–4.9)
PROTHROMBIN TIME: 12.2 SEC (ref 12.3–14.9)
RBC # BLD: 4.85 M/UL (ref 4.2–5.4)
SODIUM BLD-SCNC: 139 MEQ/L (ref 135–144)
TOTAL PROTEIN: 7.2 G/DL (ref 6.3–8)
WBC # BLD: 8 K/UL (ref 4.8–10.8)

## 2020-06-27 PROCEDURE — 87086 URINE CULTURE/COLONY COUNT: CPT

## 2020-06-27 PROCEDURE — 85025 COMPLETE CBC W/AUTO DIFF WBC: CPT

## 2020-06-27 PROCEDURE — 86901 BLOOD TYPING SEROLOGIC RH(D): CPT

## 2020-06-27 PROCEDURE — 81001 URINALYSIS AUTO W/SCOPE: CPT

## 2020-06-27 PROCEDURE — 85610 PROTHROMBIN TIME: CPT

## 2020-06-27 PROCEDURE — 80053 COMPREHEN METABOLIC PANEL: CPT

## 2020-06-27 PROCEDURE — 86850 RBC ANTIBODY SCREEN: CPT

## 2020-06-27 PROCEDURE — 85730 THROMBOPLASTIN TIME PARTIAL: CPT

## 2020-06-27 PROCEDURE — 36415 COLL VENOUS BLD VENIPUNCTURE: CPT

## 2020-06-27 PROCEDURE — 83605 ASSAY OF LACTIC ACID: CPT

## 2020-06-27 PROCEDURE — 99283 EMERGENCY DEPT VISIT LOW MDM: CPT

## 2020-06-27 PROCEDURE — 2580000003 HC RX 258: Performed by: PHYSICIAN ASSISTANT

## 2020-06-27 PROCEDURE — 86900 BLOOD TYPING SEROLOGIC ABO: CPT

## 2020-06-27 RX ORDER — 0.9 % SODIUM CHLORIDE 0.9 %
1000 INTRAVENOUS SOLUTION INTRAVENOUS ONCE
Status: COMPLETED | OUTPATIENT
Start: 2020-06-27 | End: 2020-06-28

## 2020-06-27 RX ADMIN — SODIUM CHLORIDE 1000 ML: 9 INJECTION, SOLUTION INTRAVENOUS at 23:25

## 2020-06-27 ASSESSMENT — ENCOUNTER SYMPTOMS
TROUBLE SWALLOWING: 0
ABDOMINAL PAIN: 1
SHORTNESS OF BREATH: 0
APNEA: 0
EYE PAIN: 0
ALLERGIC/IMMUNOLOGIC NEGATIVE: 1
COLOR CHANGE: 0

## 2020-06-27 ASSESSMENT — PAIN SCALES - GENERAL: PAINLEVEL_OUTOF10: 5

## 2020-06-27 ASSESSMENT — PAIN DESCRIPTION - FREQUENCY: FREQUENCY: CONTINUOUS

## 2020-06-27 ASSESSMENT — PAIN DESCRIPTION - PAIN TYPE: TYPE: ACUTE PAIN

## 2020-06-27 ASSESSMENT — PAIN DESCRIPTION - DESCRIPTORS: DESCRIPTORS: CRAMPING

## 2020-06-27 ASSESSMENT — PAIN DESCRIPTION - LOCATION: LOCATION: PELVIS

## 2020-06-28 VITALS
RESPIRATION RATE: 18 BRPM | OXYGEN SATURATION: 97 % | HEART RATE: 98 BPM | SYSTOLIC BLOOD PRESSURE: 127 MMHG | TEMPERATURE: 98.9 F | BODY MASS INDEX: 35.6 KG/M2 | WEIGHT: 165 LBS | HEIGHT: 57 IN | DIASTOLIC BLOOD PRESSURE: 88 MMHG

## 2020-06-28 LAB
ABO/RH: NORMAL
ANTIBODY SCREEN: NORMAL
BACTERIA: NEGATIVE /HPF
BILIRUBIN URINE: NEGATIVE
BLOOD, URINE: ABNORMAL
CLARITY: ABNORMAL
COLOR: ABNORMAL
EPITHELIAL CELLS, UA: ABNORMAL /HPF (ref 0–5)
GLUCOSE URINE: NEGATIVE MG/DL
HCG, URINE, POC: NEGATIVE
HYALINE CASTS: ABNORMAL /HPF (ref 0–5)
KETONES, URINE: NEGATIVE MG/DL
LEUKOCYTE ESTERASE, URINE: ABNORMAL
Lab: NORMAL
NEGATIVE QC PASS/FAIL: NORMAL
NITRITE, URINE: NEGATIVE
PH UA: 5 (ref 5–9)
POSITIVE QC PASS/FAIL: NORMAL
PROTEIN UA: 100 MG/DL
RBC UA: ABNORMAL /HPF (ref 0–2)
SPECIFIC GRAVITY UA: 1.03 (ref 1–1.03)
URINE REFLEX TO CULTURE: YES
UROBILINOGEN, URINE: 1 E.U./DL
WBC UA: ABNORMAL /HPF (ref 0–5)

## 2020-06-28 PROCEDURE — 6360000002 HC RX W HCPCS: Performed by: PHYSICIAN ASSISTANT

## 2020-06-28 PROCEDURE — 96374 THER/PROPH/DIAG INJ IV PUSH: CPT

## 2020-06-28 PROCEDURE — 96375 TX/PRO/DX INJ NEW DRUG ADDON: CPT

## 2020-06-28 RX ORDER — NAPROXEN 500 MG/1
500 TABLET ORAL 2 TIMES DAILY
Qty: 20 TABLET | Refills: 0 | Status: SHIPPED | OUTPATIENT
Start: 2020-06-28 | End: 2020-09-21

## 2020-06-28 RX ORDER — ONDANSETRON 2 MG/ML
4 INJECTION INTRAMUSCULAR; INTRAVENOUS ONCE
Status: COMPLETED | OUTPATIENT
Start: 2020-06-28 | End: 2020-06-28

## 2020-06-28 RX ORDER — MECLIZINE HYDROCHLORIDE 25 MG/1
25 TABLET ORAL 3 TIMES DAILY PRN
Qty: 15 TABLET | Refills: 0 | Status: SHIPPED | OUTPATIENT
Start: 2020-06-28 | End: 2020-07-08

## 2020-06-28 RX ORDER — KETOROLAC TROMETHAMINE 30 MG/ML
30 INJECTION, SOLUTION INTRAMUSCULAR; INTRAVENOUS ONCE
Status: COMPLETED | OUTPATIENT
Start: 2020-06-28 | End: 2020-06-28

## 2020-06-28 RX ADMIN — ONDANSETRON 4 MG: 2 INJECTION INTRAMUSCULAR; INTRAVENOUS at 00:56

## 2020-06-28 RX ADMIN — KETOROLAC TROMETHAMINE 30 MG: 30 INJECTION, SOLUTION INTRAMUSCULAR at 00:56

## 2020-06-28 ASSESSMENT — PAIN SCALES - GENERAL: PAINLEVEL_OUTOF10: 0

## 2020-06-28 NOTE — ED PROVIDER NOTES
the review of systems was reviewed and negative. PAST MEDICAL HISTORY     Past Medical History:   Diagnosis Date    Anxiety     Manic depression ([facility] [address]          SURGICAL HISTORY     No past surgical history on file. CURRENT MEDICATIONS       Previous Medications    FAMOTIDINE (PEPCID) 20 MG TABLET    Take 1 tablet by mouth 2 times daily for 5 days May substitute for equivalent medication if Pepcid is not available with equivalent dosing. ONDANSETRON (ZOFRAN ODT) 4 MG DISINTEGRATING TABLET    Take 1 tablet by mouth every 8 hours as needed for Nausea or Vomiting       ALLERGIES     Other    FAMILY HISTORY     No family history on file.        SOCIAL HISTORY       Social History     Socioeconomic History    Marital status: Single     Spouse name: Not on file    Number of children: Not on file    Years of education: Not on file    Highest education level: Not on file   Occupational History    Not on file   Social Needs    Financial resource strain: Not on file    Food insecurity     Worry: Not on file     Inability: Not on file    Transportation needs     Medical: Not on file     Non-medical: Not on file   Tobacco Use    Smoking status: Former Smoker    Smokeless tobacco: Never Used    Tobacco comment: quit 1 month ago   Substance and Sexual Activity    Alcohol use: Yes     Comment: socially    Drug use: No    Sexual activity: Not on file   Lifestyle    Physical activity     Days per week: Not on file     Minutes per session: Not on file    Stress: Not on file   Relationships    Social connections     Talks on phone: Not on file     Gets together: Not on file     Attends Alevism service: Not on file     Active member of club or organization: Not on file     Attends meetings of clubs or organizations: Not on file     Relationship status: Not on file    Intimate partner violence     Fear of current or ex partner: Not on file     Emotionally abused: Not on file     Physically abused: Not on file     Forced sexual activity: Not on file   Other Topics Concern    Not on file   Social History Narrative    ** Merged History Encounter **            SCREENINGS           PHYSICAL EXAM    (up to 7 forlevel 4, 8 or more for level 5)     ED Triage Vitals [06/27/20 2240]   BP Temp Temp Source Pulse Resp SpO2 Height Weight   (!) 134/91 98.9 °F (37.2 °C) Oral 111 16 97 % 4' 9\" (1.448 m) 165 lb (74.8 kg)       Physical Exam  Vitals signs and nursing note reviewed. Constitutional:       General: She is not in acute distress. Appearance: She is well-developed. She is not diaphoretic. HENT:      Head: Normocephalic and atraumatic. Mouth/Throat:      Pharynx: No oropharyngeal exudate. Eyes:      General: No scleral icterus. Conjunctiva/sclera: Conjunctivae normal.      Pupils: Pupils are equal, round, and reactive to light. Neck:      Musculoskeletal: Normal range of motion and neck supple. Trachea: No tracheal deviation. Cardiovascular:      Rate and Rhythm: Normal rate. Heart sounds: Normal heart sounds. Pulmonary:      Effort: Pulmonary effort is normal. No respiratory distress. Breath sounds: Normal breath sounds. Abdominal:      General: Bowel sounds are normal. There is no distension. Palpations: Abdomen is soft. Tenderness: There is abdominal tenderness. Musculoskeletal: Normal range of motion. Skin:     General: Skin is warm and dry. Findings: No erythema or rash. Neurological:      Mental Status: She is alert and oriented to person, place, and time. Cranial Nerves: No cranial nerve deficit. Motor: No abnormal muscle tone. Psychiatric:         Behavior: Behavior normal.         Thought Content:  Thought content normal.         Judgment: Judgment normal.           DIAGNOSTIC RESULTS     RADIOLOGY:   Non-plain film images such as CT, Ultrasound and MRI are read by the radiologist. Plain radiographic images are visualized and preliminarilyinterpreted by Olivia Wahl PA-C with the below findings:        Interpretation per the Radiologist below, if available at the time of this note:    No orders to display       LABS:  Labs Reviewed   URINE RT REFLEX TO CULTURE - Abnormal; Notable for the following components:       Result Value    Color, UA RED (*)     Clarity, UA CLOUDY (*)     Blood, Urine LARGE (*)     Protein,  (*)     Leukocyte Esterase, Urine SMALL (*)     All other components within normal limits   COMPREHENSIVE METABOLIC PANEL - Abnormal; Notable for the following components:    Glucose 124 (*)     All other components within normal limits   PROTIME-INR - Abnormal; Notable for the following components:    Protime 12.2 (*)     All other components within normal limits   CBC WITH AUTO DIFFERENTIAL   LACTIC ACID, PLASMA   APTT   MICROSCOPIC URINALYSIS   POC PREGNANCY UR-QUAL   TYPE AND SCREEN       All other labs were within normal range or not returnedas of this dictation. EMERGENCYDEPARTMENT COURSE and DIFFERENTIAL DIAGNOSIS/MDM:   Vitals:    Vitals:    06/27/20 2240   BP: (!) 134/91   Pulse: 111   Resp: 16   Temp: 98.9 °F (37.2 °C)   TempSrc: Oral   SpO2: 97%   Weight: 165 lb (74.8 kg)   Height: 4' 9\" (1.448 m)       REASSESSMENT      Patient presented with an episode of dysfunctional uterine bleeding. Pregnancy test is negative and laboratory testing is unremarkable. Patient will be discharged home with anti-inflammatories, Antivert and referred to gynecology for outpatient follow-up    MDM    PROCEDURES:    Procedures      FINAL IMPRESSION      1. DUB (dysfunctional uterine bleeding)    2.  Dizziness          DISPOSITION/PLAN   DISPOSITION Discharge - Pending Orders Complete 06/28/2020 12:32:01 AM      PATIENT REFERRED TO:  MD Barney Gomez [place] [address] [phone]    Call in 3 days        DISCHARGE MEDICATIONS:  New Prescriptions    MECLIZINE (ANTIVERT) 25 MG TABLET    Take 1 tablet by mouth 3 times daily as needed for Dizziness    NAPROXEN (NAPROSYN) 500 MG TABLET    Take 1 tablet by mouth 2 times daily       (Please note that portions of this note were completed with a voice recognition program.  Efforts were made to edit the dictations but occasionally words are mis-transcribed.)    ABHINAV Bartlett PA-C  06/28/20 1727

## 2020-06-29 LAB — URINE CULTURE, ROUTINE: NORMAL

## 2020-09-11 ENCOUNTER — APPOINTMENT (OUTPATIENT)
Dept: ULTRASOUND IMAGING | Age: 25
End: 2020-09-11
Payer: COMMERCIAL

## 2020-09-11 ENCOUNTER — HOSPITAL ENCOUNTER (EMERGENCY)
Age: 25
Discharge: HOME | End: 2020-09-11
Payer: COMMERCIAL

## 2020-09-11 VITALS
RESPIRATION RATE: 20 BRPM | HEART RATE: 88 BPM | WEIGHT: 187 LBS | OXYGEN SATURATION: 97 % | TEMPERATURE: 97.1 F | BODY MASS INDEX: 40.34 KG/M2 | DIASTOLIC BLOOD PRESSURE: 79 MMHG | HEIGHT: 57 IN | SYSTOLIC BLOOD PRESSURE: 132 MMHG

## 2020-09-11 LAB
ABO/RH: NORMAL
ALBUMIN SERPL-MCNC: 3.8 G/DL (ref 3.5–4.6)
ALP BLD-CCNC: 51 U/L (ref 40–130)
ALT SERPL-CCNC: 20 U/L (ref 0–33)
ANION GAP SERPL CALCULATED.3IONS-SCNC: 12 MEQ/L (ref 9–15)
AST SERPL-CCNC: 11 U/L (ref 0–35)
BASOPHILS ABSOLUTE: 0 K/UL (ref 0–0.2)
BASOPHILS RELATIVE PERCENT: 0.4 %
BILIRUB SERPL-MCNC: 0.5 MG/DL (ref 0.2–0.7)
BILIRUBIN URINE: NEGATIVE
BLOOD, URINE: NEGATIVE
BUN BLDV-MCNC: 4 MG/DL (ref 6–20)
CALCIUM SERPL-MCNC: 9.3 MG/DL (ref 8.5–9.9)
CHLORIDE BLD-SCNC: 105 MEQ/L (ref 95–107)
CHP ED QC CHECK: NORMAL
CLARITY: CLEAR
CO2: 20 MEQ/L (ref 20–31)
COLOR: YELLOW
CREAT SERPL-MCNC: 0.43 MG/DL (ref 0.5–0.9)
EOSINOPHILS ABSOLUTE: 0 K/UL (ref 0–0.7)
EOSINOPHILS RELATIVE PERCENT: 0.5 %
GFR AFRICAN AMERICAN: >60
GFR NON-AFRICAN AMERICAN: >60
GLOBULIN: 2.9 G/DL (ref 2.3–3.5)
GLUCOSE BLD-MCNC: 98 MG/DL (ref 70–99)
GLUCOSE URINE: NEGATIVE MG/DL
GONADOTROPIN, CHORIONIC (HCG) QUANT: NORMAL MIU/ML
HCT VFR BLD CALC: 40 % (ref 37–47)
HEMOGLOBIN: 13.9 G/DL (ref 12–16)
KETONES, URINE: ABNORMAL MG/DL
LEUKOCYTE ESTERASE, URINE: NEGATIVE
LYMPHOCYTES ABSOLUTE: 1.4 K/UL (ref 1–4.8)
LYMPHOCYTES RELATIVE PERCENT: 16.9 %
MCH RBC QN AUTO: 30.7 PG (ref 27–31.3)
MCHC RBC AUTO-ENTMCNC: 34.8 % (ref 33–37)
MCV RBC AUTO: 88.2 FL (ref 82–100)
MONOCYTES ABSOLUTE: 0.4 K/UL (ref 0.2–0.8)
MONOCYTES RELATIVE PERCENT: 5.6 %
NEUTROPHILS ABSOLUTE: 6.2 K/UL (ref 1.4–6.5)
NEUTROPHILS RELATIVE PERCENT: 76.6 %
NITRITE, URINE: NEGATIVE
PDW BLD-RTO: 13.4 % (ref 11.5–14.5)
PH UA: 6.5 (ref 5–9)
PLATELET # BLD: 206 K/UL (ref 130–400)
POTASSIUM SERPL-SCNC: 3.8 MEQ/L (ref 3.4–4.9)
PREGNANCY TEST URINE, POC: POSITIVE
PROTEIN UA: NEGATIVE MG/DL
RBC # BLD: 4.54 M/UL (ref 4.2–5.4)
SODIUM BLD-SCNC: 137 MEQ/L (ref 135–144)
SPECIFIC GRAVITY UA: 1.02 (ref 1–1.03)
TOTAL PROTEIN: 6.7 G/DL (ref 6.3–8)
URINE REFLEX TO CULTURE: ABNORMAL
UROBILINOGEN, URINE: 1 E.U./DL
WBC # BLD: 8 K/UL (ref 4.8–10.8)

## 2020-09-11 PROCEDURE — 81003 URINALYSIS AUTO W/O SCOPE: CPT

## 2020-09-11 PROCEDURE — 76801 OB US < 14 WKS SINGLE FETUS: CPT

## 2020-09-11 PROCEDURE — 86900 BLOOD TYPING SEROLOGIC ABO: CPT

## 2020-09-11 PROCEDURE — 85025 COMPLETE CBC W/AUTO DIFF WBC: CPT

## 2020-09-11 PROCEDURE — 84702 CHORIONIC GONADOTROPIN TEST: CPT

## 2020-09-11 PROCEDURE — 86901 BLOOD TYPING SEROLOGIC RH(D): CPT

## 2020-09-11 PROCEDURE — 96372 THER/PROPH/DIAG INJ SC/IM: CPT

## 2020-09-11 PROCEDURE — 99284 EMERGENCY DEPT VISIT MOD MDM: CPT

## 2020-09-11 PROCEDURE — 6360000002 HC RX W HCPCS: Performed by: PERSONAL EMERGENCY RESPONSE ATTENDANT

## 2020-09-11 PROCEDURE — 36415 COLL VENOUS BLD VENIPUNCTURE: CPT

## 2020-09-11 PROCEDURE — 80053 COMPREHEN METABOLIC PANEL: CPT

## 2020-09-11 RX ORDER — SODIUM CHLORIDE 9 MG/ML
INJECTION, SOLUTION INTRAVENOUS
Status: DISCONTINUED
Start: 2020-09-11 | End: 2020-09-11 | Stop reason: HOSPADM

## 2020-09-11 RX ADMIN — HUMAN RHO(D) IMMUNE GLOBULIN 300 MCG: 1500 SOLUTION INTRAMUSCULAR; INTRAVENOUS at 19:45

## 2020-09-11 ASSESSMENT — ENCOUNTER SYMPTOMS
CONSTIPATION: 0
COUGH: 0
DIARRHEA: 0
SHORTNESS OF BREATH: 0
NAUSEA: 0
ABDOMINAL PAIN: 1
COLOR CHANGE: 0
ABDOMINAL DISTENTION: 0
CHOKING: 0
SORE THROAT: 0
RECTAL PAIN: 0
RHINORRHEA: 0
EYE DISCHARGE: 0
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
## 2020-09-21 ENCOUNTER — OFFICE VISIT (OUTPATIENT)
Dept: OBGYN CLINIC | Age: 25
End: 2020-09-21
Payer: COMMERCIAL

## 2020-09-21 VITALS
DIASTOLIC BLOOD PRESSURE: 64 MMHG | HEART RATE: 88 BPM | SYSTOLIC BLOOD PRESSURE: 92 MMHG | WEIGHT: 184 LBS | HEIGHT: 57 IN | BODY MASS INDEX: 39.7 KG/M2

## 2020-09-21 PROCEDURE — G8427 DOCREV CUR MEDS BY ELIG CLIN: HCPCS | Performed by: OBSTETRICS & GYNECOLOGY

## 2020-09-21 PROCEDURE — G8419 CALC BMI OUT NRM PARAM NOF/U: HCPCS | Performed by: OBSTETRICS & GYNECOLOGY

## 2020-09-21 PROCEDURE — 99203 OFFICE O/P NEW LOW 30 MIN: CPT | Performed by: OBSTETRICS & GYNECOLOGY

## 2020-09-21 PROCEDURE — 1036F TOBACCO NON-USER: CPT | Performed by: OBSTETRICS & GYNECOLOGY

## 2020-09-21 NOTE — PROGRESS NOTES
Initial OB visit      Eulogio Moseley is a 23y.o. year old female  @ L 2020, 12.2 wks , CHAKA 4/3/2021 , consistent with 11 wk US  . Complaints : nausea with vomiting for several days . POB: SAB     PGYN: denies     PMH: Obesity             Bipolar disorder. - not on meds. Paranoid disorder             Generalized anxiety     PSH: denies     MEDS: denies     Drug Allergies: Allergies   Allergen Reactions    Other      Cherries, strawberries, green apples and artificial flavors of all of the above. SOCHX: denies . Stopped at 5 wks pregnant . FH: Mother or Father , DM Yes , HTN Yes, Other Yes    BP 92/64 (Site: Left Upper Arm, Position: Sitting, Cuff Size: Large Adult)   Pulse 88   Ht 4' 9\" (1.448 m)   Wt 184 lb (83.5 kg)   LMP 2020 (Approximate)   BMI 39.82 kg/m²   Past Medical History:   Diagnosis Date    Anxiety     Manic depression ([facility] [address]      No past surgical history on file. Review of Systems  Constitutional: negative  Genitourinary:see above  Integument/breast: negative  Behavioral/Psych: negative  Endocrine: negative     All other systems reviewed and are negative. Physical Exam:  BP 92/64 (Site: Left Upper Arm, Position: Sitting, Cuff Size: Large Adult)   Pulse 88   Ht 4' 9\" (1.448 m)   Wt 184 lb (83.5 kg)   LMP 2020 (Approximate)   BMI 39.82 kg/m²   Skin: Warm, dry, no lesions or rashes  Extremities: Without clubbing, cyanosis and edema. Palms and nails are normal. Ambulates without difficulty  Neurological: No gross sensory or motor deficits.   Abdomen: Soft, non-tender without masses or organomegaly  bowel sounds normoactive  External Genitalia: Normal anatomy, no lesions or masses  Pubic Hair Distribution: Normal pattern and distribution  Pelvic Floor: Normal pelvic support, no significant cystocele or rectocele  Perineum: No fissures, lesions or leukoplakia  Urethra: Normal  Vagina: Moist, pink, supple, no lesions, no abnormal discharge  Cervix: Firm, nontender, no lesions  Uterus: firm, mobile, nontender, no masses or irregularities      Assessment:   1. Amenorrhea        Plan:   Orders Placed This Encounter   Procedures    Culture, Urine     Standing Status:   Future     Standing Expiration Date:   9/21/2021     Order Specific Question:   Specify (ex-cath, midstream, cysto, etc)? Answer:   midstream    CBC Auto Differential     Standing Status:   Future     Standing Expiration Date:   9/21/2021    HCG, Quantitative, Pregnancy     Standing Status:   Future     Standing Expiration Date:   9/21/2021    Hemoglobinopathy Eval (Electrophoresis)     Standing Status:   Future     Standing Expiration Date:   9/21/2021    Hepatitis B Surface Antigen     Standing Status:   Future     Standing Expiration Date:   9/21/2021    HIV-1,2 Combo Ag/Ab By LORELEI, Reflexive Panel     Standing Status:   Future     Standing Expiration Date:   9/21/2021    Rubella antibody, IgG     Standing Status:   Future     Standing Expiration Date:   9/21/2021    RPR Reflex to Titer and TPPA     Standing Status:   Future     Standing Expiration Date:   9/21/2021    Urinalysis     Standing Status:   Future     Standing Expiration Date:   9/21/2021    Urine Drug Screen     Standing Status:   Future     Standing Expiration Date:   9/21/2021    Varicella Zoster Antibody, IgG     Standing Status:   Future     Standing Expiration Date:   9/21/2021    Prenatal Testing for Fetal Aneuploidy     PANORAMA TEST     Standing Status:   Future     Standing Expiration Date:   9/21/2021    Carrier Screen, 4 Conditions ([place])     Standing Status:   Future     Standing Expiration Date:   9/21/2021    Type and screen     Standing Status:   Future     Standing Expiration Date:   9/21/2021      No orders of the defined types were placed in this encounter.     Plan:   MD Michell Gandhi  9/21/2020  Patient's last menstrual period was 06/28/2020 (approximate). INITIAL OBSTETRICAL VISIT EVALUATION:  The patient was seen full history and physical was completed/reviewed. Cytology was collected for patients over 24years of age. Cultures were collected. The patient was counseled on office policies and she was counseled on termination of pregnancy in the state of [place]. The patient was counseled on Toxoplasmosis, HIV, Tobacco Abuse, Group Beta Strep Infections, Cystic Fibrosis,  Labor precautions and Sickle Cell disease. The patient was counseled on the risks of tobacco abuse. Both maternal and fetal. She was instructed to stop smoking if currently using tobacco. Morbidity, mortality, and cessation programs were reviewed. The risks include but are not limited to increased risks of  labor,  delivery, premature rupture of membranes, intrauterine growth restriction, intrauterine fetal demise and abruptio placenta. Secondary smoke risks were also reviewed. Increases in cancer, respiratory problems, and sudden infant death syndrome were reviewed as well. The patient was informed of a 2-4% risk of congenital anomalies in the general population. She was also informed that karyotyping is the only way to evaluate the fetus for genetic problems and genetic lethal anomalies. Chorionic villous sampling, amniocentesis and Maternal Genetic Blood Sampling-(NIPT Testing) were also discussed with morbidity rates in detail. She requested any of the options. Route of delivery and counseling on vaginal, operative vaginal, and  sections were completed with the risks of each to both the patient as well as her baby. The possibility of a blood transfusion was discussed as well. The patient was not opposed to receiving a transfusion if needed. Nuchal translucency and MSAFP single marker testing was reviewed in detail with attention to timing of testing and their windows.  For patients beyond the gestational age for Nuchal translucency evaluation Quad testing was recommended. Timing for the Quad test was reviewed. Benefits of the above testing was reviewed. A second trimester amniocentesis was also made available to the patient. Risks, Benefits and non-invasive alternative testing was reviewed. The literature regarding a questionable link to pitocin augmentation and induction of labor, the assistance of labor contractions and the initiation of contractions to help delivery, have been reviewed with the patient regarding the increased potential of having a  with Attention Deficit Hyperactivity Disorder and or Autism. These two disorders and the ramifications of their impact on a child and the family caring for that child has been reviewed with the patient in detail. She was given the risks, benefits and alternatives of the use of this medication. She has agreed to its use in the delivery of her unborn child if needed at the time of delivery, Yes. The patient was questioned in detail regarding any genetic misnomer history, chromosomal abnormalities, or learning disabilities in  herself, the father of the baby or their families. SHE DENIED ANY HISTORY AS STATED ABOVE: Yes    Upon completion of the visit all questions were answered and the patients follow-up and testing schedule were reviewed. Prenatal vitamins were given. Initial labs drawn. Prenatal vitamins. Problem list reviewed and updated. Counseled about QUAD/ NIPT  Role of ultrasound in pregnancy discussed  Follow-up in 2 weeks  Early 1 hr OGTT - indicated ordered  Hep C screen indicated : no  FLU- was offered and declined by the patient  TDAP- in third trimester     Sarah Davis M.D., TORSTEN. G

## 2020-09-23 DIAGNOSIS — N91.2 AMENORRHEA: ICD-10-CM

## 2020-09-23 LAB
BASOPHILS ABSOLUTE: 0 K/UL (ref 0–0.2)
BASOPHILS RELATIVE PERCENT: 0.3 %
EOSINOPHILS ABSOLUTE: 0.1 K/UL (ref 0–0.7)
EOSINOPHILS RELATIVE PERCENT: 1 %
GONADOTROPIN, CHORIONIC (HCG) QUANT: NORMAL MIU/ML
HCT VFR BLD CALC: 40.7 % (ref 37–47)
HEMOGLOBIN: 13.8 G/DL (ref 12–16)
HEPATITIS B SURFACE ANTIGEN INTERPRETATION: NORMAL
LYMPHOCYTES ABSOLUTE: 1.3 K/UL (ref 1–4.8)
LYMPHOCYTES RELATIVE PERCENT: 17.5 %
MCH RBC QN AUTO: 30.6 PG (ref 27–31.3)
MCHC RBC AUTO-ENTMCNC: 33.9 % (ref 33–37)
MCV RBC AUTO: 90.2 FL (ref 82–100)
MONOCYTES ABSOLUTE: 0.3 K/UL (ref 0.2–0.8)
MONOCYTES RELATIVE PERCENT: 4.8 %
NEUTROPHILS ABSOLUTE: 5.5 K/UL (ref 1.4–6.5)
NEUTROPHILS RELATIVE PERCENT: 76.4 %
PDW BLD-RTO: 13.4 % (ref 11.5–14.5)
PLATELET # BLD: 193 K/UL (ref 130–400)
RBC # BLD: 4.52 M/UL (ref 4.2–5.4)
RUBELLA ANTIBODY IGG: 40.4 IU/ML
WBC # BLD: 7.2 K/UL (ref 4.8–10.8)

## 2020-09-24 LAB
ABO/RH: NORMAL
ANTIBODY IDENTIFICATION: NORMAL
ANTIBODY SCREEN: NORMAL
RPR: NORMAL

## 2020-09-25 LAB
HEMOGLOBIN A-1 QUANTITATION: 96.8 % (ref 95–97.9)
HEMOGLOBIN A2 QUANTITATION: 2.9 % (ref 2–3.5)
HEMOGLOBIN C QUANTITATION: 0 % (ref 0–0)
HEMOGLOBIN E QUANTITATION: 0 % (ref 0–0)
HEMOGLOBIN ELECTROPHORESIS: NORMAL
HEMOGLOBIN EVALUATION: NORMAL
HEMOGLOBIN F QUANTITATION: 0.3 % (ref 0–2.1)
HEMOGLOBIN OTHER: 0 % (ref 0–0)
HEMOGLOBIN S QUANTITATION: 0 % (ref 0–0)
HIV 1,2 COMBO ANTIGEN/ANTIBODY: NEGATIVE
SICKLE CELL: NORMAL
VZV IGG SER QL IA: 18.1 IV

## 2020-10-05 ENCOUNTER — INITIAL PRENATAL (OUTPATIENT)
Dept: OBGYN CLINIC | Age: 25
End: 2020-10-05

## 2020-10-05 LAB
EER NON INVASIVE PRENATAL ANEUPLOIDY: NORMAL
FETAL FRACTION: 6.6 %
FETAL GENDER: NORMAL
FETUS COUNT: 1
GESTATIONAL AGE (DAYS): 6
GESTATIONAL AGE(WEEKS): 12
HEIGHT: NORMAL
Lab: NORMAL
MATERNAL WEIGHT: 184
MONOSOMY X: NORMAL
REPORT FETUS GENDER: YES
TRIPLOIDY (VANISHING TWIN): NORMAL
TRISOMY 13 RISK: NORMAL
TRISOMY 18 RISK ASSESSMENT: NORMAL
TRISOMY 21 RISK: NORMAL

## 2020-10-11 LAB
CARRIER SCREEN, 4 CONDITIONS: NORMAL
EER CARRIER SCREEN, 4 CONDITIONS: NORMAL

## 2020-10-12 NOTE — PROGRESS NOTES
OB visit      Makenna Liao is a 23y.o. year old female  @ L 2020, 14.2 wks , CHAKA 4/3/2021 , consistent with 11 wk US  . Complaints : nausea with vomiting for several days . POB: SAB     PGYN: denies     PMH: Obesity             Bipolar disorder. - not on meds. Paranoid disorder             Generalized anxiety     PSH: denies     MEDS: denies     Drug Allergies: Allergies   Allergen Reactions    Other      Cherries, strawberries, green apples and artificial flavors of all of the above. SOCHX: denies . Stopped at 5 wks pregnant . FH: Mother or Father , DM Yes , HTN Yes, Other Yes    LMP 2020   Past Medical History:   Diagnosis Date    Anxiety     Manic depression ([address]      No past surgical history on file. Review of Systems  Constitutional: negative  Genitourinary:see above  Integument/breast: negative  Behavioral/Psych: negative  Endocrine: negative     All other systems reviewed and are negative. Physical Exam:  LMP 2020   Skin: Warm, dry, no lesions or rashes  Extremities: Without clubbing, cyanosis and edema. Palms and nails are normal. Ambulates without difficulty  Neurological: No gross sensory or motor deficits. Abdomen: Soft, non-tender without masses or organomegaly  bowel sounds normoactive  External Genitalia: Normal anatomy, no lesions or masses    HOF : suprapubic     FHT : 150 bpm     Assessment:   1. 14 weeks gestation of pregnancy    2. STEPHANIE (generalized anxiety disorder)    3. Panic disorder    4. Paranoid disorder ([address]        Plan:   Orders Placed This Encounter   Procedures    Glucose tolerance, 1 hour     Standing Status:   Future     Standing Expiration Date:   10/5/2021    CBC Auto Differential     Standing Status:   Future     Standing Expiration Date:   10/5/2021      No orders of the defined types were placed in this encounter.     Plan:   Ysabel Ruiz MD      Follow-up in 4 weeks  Early 1 hr OGTT - indicated ordered  Hep C screen indicated : no  FLU- was offered and declined by the patient  TDAP- in third trimester     Anette Jules M.D., F.A.C.O. G

## 2020-11-02 ENCOUNTER — ROUTINE PRENATAL (OUTPATIENT)
Dept: OBGYN CLINIC | Age: 25
End: 2020-11-02
Payer: COMMERCIAL

## 2020-11-02 VITALS
BODY MASS INDEX: 40.47 KG/M2 | DIASTOLIC BLOOD PRESSURE: 64 MMHG | HEART RATE: 84 BPM | SYSTOLIC BLOOD PRESSURE: 100 MMHG | WEIGHT: 187 LBS

## 2020-11-02 DIAGNOSIS — N91.2 AMENORRHEA: ICD-10-CM

## 2020-11-02 DIAGNOSIS — Z3A.18 18 WEEKS GESTATION OF PREGNANCY: ICD-10-CM

## 2020-11-02 LAB
AMPHETAMINE SCREEN, URINE: NORMAL
BARBITURATE SCREEN URINE: NORMAL
BENZODIAZEPINE SCREEN, URINE: NORMAL
BILIRUBIN URINE: NEGATIVE
BLOOD, URINE: NEGATIVE
CANNABINOID SCREEN URINE: NORMAL
CLARITY: ABNORMAL
COCAINE METABOLITE SCREEN URINE: NORMAL
COLOR: YELLOW
GLUCOSE URINE: NEGATIVE MG/DL
KETONES, URINE: NEGATIVE MG/DL
LEUKOCYTE ESTERASE, URINE: ABNORMAL
Lab: NORMAL
METHADONE SCREEN, URINE: NORMAL
NITRITE, URINE: POSITIVE
OPIATE SCREEN URINE: NORMAL
OXYCODONE URINE: NORMAL
PH UA: 7 (ref 5–9)
PHENCYCLIDINE SCREEN URINE: NORMAL
PROPOXYPHENE SCREEN: NORMAL
PROTEIN UA: NEGATIVE MG/DL
SPECIFIC GRAVITY UA: 1.02 (ref 1–1.03)
UROBILINOGEN, URINE: 1 E.U./DL

## 2020-11-02 PROCEDURE — G8427 DOCREV CUR MEDS BY ELIG CLIN: HCPCS | Performed by: OBSTETRICS & GYNECOLOGY

## 2020-11-02 PROCEDURE — 1036F TOBACCO NON-USER: CPT | Performed by: OBSTETRICS & GYNECOLOGY

## 2020-11-02 PROCEDURE — G8419 CALC BMI OUT NRM PARAM NOF/U: HCPCS | Performed by: OBSTETRICS & GYNECOLOGY

## 2020-11-02 PROCEDURE — 99213 OFFICE O/P EST LOW 20 MIN: CPT | Performed by: OBSTETRICS & GYNECOLOGY

## 2020-11-02 PROCEDURE — G8484 FLU IMMUNIZE NO ADMIN: HCPCS | Performed by: OBSTETRICS & GYNECOLOGY

## 2020-11-02 RX ORDER — CYCLOBENZAPRINE HCL 10 MG
10 TABLET ORAL NIGHTLY PRN
Qty: 30 TABLET | Refills: 0 | Status: SHIPPED | OUTPATIENT
Start: 2020-11-02 | End: 2020-11-12

## 2020-11-02 NOTE — PROGRESS NOTES
Encounter   Medications    cyclobenzaprine (FLEXERIL) 10 MG tablet     Sig: Take 1 tablet by mouth nightly as needed for Muscle spasms     Dispense:  30 tablet     Refill:  0     Plan:   Ysabel Ruiz MD      Follow-up in 4 weeks  Early 1 hr OGTT - indicated ordered  Hep C screen indicated : no  FLU- was offered and declined by the patient  TDAP- in third trimester     Carole Pate M.D., F.A.C.O. G

## 2020-11-03 LAB
AMORPHOUS: ABNORMAL
BACTERIA: ABNORMAL /HPF
COARSE CASTS, UA: ABNORMAL /LPF (ref 0–5)
CRYSTALS, UA: ABNORMAL /HPF
EPITHELIAL CELLS, UA: >100 /HPF (ref 0–5)
RBC UA: ABNORMAL /HPF (ref 0–2)
WBC UA: ABNORMAL /HPF (ref 0–5)

## 2020-11-04 LAB — URINE CULTURE, ROUTINE: NORMAL

## 2020-11-05 LAB
C. TRACHOMATIS DNA ,URINE: NEGATIVE
N. GONORRHOEAE DNA, URINE: NEGATIVE

## 2020-11-09 RX ORDER — NITROFURANTOIN 25; 75 MG/1; MG/1
100 CAPSULE ORAL 2 TIMES DAILY
Qty: 20 CAPSULE | Refills: 0 | Status: SHIPPED | OUTPATIENT
Start: 2020-11-09 | End: 2020-11-19

## 2020-11-20 ENCOUNTER — HOSPITAL ENCOUNTER (OUTPATIENT)
Dept: ULTRASOUND IMAGING | Age: 25
Discharge: HOME | End: 2020-11-22
Payer: COMMERCIAL

## 2020-11-20 PROCEDURE — 76805 OB US >/= 14 WKS SNGL FETUS: CPT

## 2020-12-04 ENCOUNTER — ROUTINE PRENATAL (OUTPATIENT)
Dept: OBGYN CLINIC | Age: 25
End: 2020-12-04
Payer: COMMERCIAL

## 2020-12-04 VITALS
HEART RATE: 88 BPM | WEIGHT: 193 LBS | DIASTOLIC BLOOD PRESSURE: 64 MMHG | SYSTOLIC BLOOD PRESSURE: 98 MMHG | BODY MASS INDEX: 41.76 KG/M2

## 2020-12-04 PROCEDURE — G8484 FLU IMMUNIZE NO ADMIN: HCPCS | Performed by: OBSTETRICS & GYNECOLOGY

## 2020-12-04 PROCEDURE — 99213 OFFICE O/P EST LOW 20 MIN: CPT | Performed by: OBSTETRICS & GYNECOLOGY

## 2020-12-04 PROCEDURE — G8427 DOCREV CUR MEDS BY ELIG CLIN: HCPCS | Performed by: OBSTETRICS & GYNECOLOGY

## 2020-12-04 PROCEDURE — 1036F TOBACCO NON-USER: CPT | Performed by: OBSTETRICS & GYNECOLOGY

## 2020-12-04 PROCEDURE — G8419 CALC BMI OUT NRM PARAM NOF/U: HCPCS | Performed by: OBSTETRICS & GYNECOLOGY

## 2020-12-18 DIAGNOSIS — Z3A.14 14 WEEKS GESTATION OF PREGNANCY: ICD-10-CM

## 2020-12-18 LAB
BASOPHILS ABSOLUTE: 0 K/UL (ref 0–0.2)
BASOPHILS RELATIVE PERCENT: 0.2 %
EOSINOPHILS ABSOLUTE: 0.1 K/UL (ref 0–0.7)
EOSINOPHILS RELATIVE PERCENT: 0.8 %
GLUCOSE, 1HR PP: 171 MG/DL (ref 60–140)
HCT VFR BLD CALC: 37.1 % (ref 37–47)
HEMOGLOBIN: 12.4 G/DL (ref 12–16)
LYMPHOCYTES ABSOLUTE: 1.3 K/UL (ref 1–4.8)
LYMPHOCYTES RELATIVE PERCENT: 12.3 %
MCH RBC QN AUTO: 30.8 PG (ref 27–31.3)
MCHC RBC AUTO-ENTMCNC: 33.4 % (ref 33–37)
MCV RBC AUTO: 92.2 FL (ref 82–100)
MONOCYTES ABSOLUTE: 0.4 K/UL (ref 0.2–0.8)
MONOCYTES RELATIVE PERCENT: 3.8 %
NEUTROPHILS ABSOLUTE: 8.8 K/UL (ref 1.4–6.5)
NEUTROPHILS RELATIVE PERCENT: 82.9 %
PDW BLD-RTO: 13.5 % (ref 11.5–14.5)
PLATELET # BLD: 219 K/UL (ref 130–400)
RBC # BLD: 4.03 M/UL (ref 4.2–5.4)
WBC # BLD: 10.6 K/UL (ref 4.8–10.8)

## 2020-12-28 ENCOUNTER — HOSPITAL ENCOUNTER (OUTPATIENT)
Age: 25
Discharge: HOME | End: 2020-12-28
Attending: OBSTETRICS & GYNECOLOGY | Admitting: OBSTETRICS & GYNECOLOGY
Payer: COMMERCIAL

## 2020-12-28 VITALS
RESPIRATION RATE: 16 BRPM | DIASTOLIC BLOOD PRESSURE: 58 MMHG | BODY MASS INDEX: 42.5 KG/M2 | HEART RATE: 77 BPM | WEIGHT: 197 LBS | TEMPERATURE: 98.3 F | SYSTOLIC BLOOD PRESSURE: 109 MMHG | HEIGHT: 57 IN

## 2020-12-28 PROCEDURE — 59025 FETAL NON-STRESS TEST: CPT | Performed by: OBSTETRICS & GYNECOLOGY

## 2020-12-28 PROCEDURE — 59025 FETAL NON-STRESS TEST: CPT

## 2020-12-28 PROCEDURE — 99282 EMERGENCY DEPT VISIT SF MDM: CPT | Performed by: OBSTETRICS & GYNECOLOGY

## 2020-12-28 PROCEDURE — 99283 EMERGENCY DEPT VISIT LOW MDM: CPT

## 2020-12-28 NOTE — FLOWSHEET NOTE
at bedside, viewed strip, in to talk to patient. SVE per , closed. Discharge instructions given to patient per [facility]- Patient verbalized understanding.

## 2020-12-28 NOTE — PROGRESS NOTES
Department of Obstetrics and Gynecology  Labor and Delivery  Attending Triage Note      SUBJECTIVE:  Pt presents for CC of left lower abd pain. 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 Upon arrival to ED it had improved. Presently it is resolved. No prior episode in this pregnancy or anything similar in her hx. She denies UTI related sx and GI sx. She denies LOF, VB, Ctx and reports +FM    OBJECTIVE  Vitals:    20 1039 20 1123   BP: (!) 119/52 (!) 109/58   Pulse: 77 77   Resp: 16 16   Temp: 98.7 °F (37.1 °C) 98.3 °F (36.8 °C)   TempSrc: Oral Oral   Weight: 197 lb (89.4 kg)    Height: 4' 9\" (1.448 m)             A+O X 3. No distress  Even, unlabored respirations  Soft, NT, ND   MSK 5/5 LE  Neuro: Nonfocal       Cervix:           Cl/th/h      Fetal heart rate:  FHR 130s, mod variability + 10x10 accelerations isolated brief variable decelerations c/w gestational age  [place] irregular     DATA:       ASSESSMENT & PLAN:      Patient Active Hospital Problem List:    contractions (2016)      Assessment: 23 y.o.  26w2d with round ligament pain       Plan:   - FWB: Reassuring.   - R/o for PTL.  Symptomatic treatment measures discussed    - Labor/appropriate return precautions explained  - F/u with primary OB as scheduled     Electronically signed by Glennis Babinski, MD on 2020 at 11:32 AM

## 2020-12-28 NOTE — FLOWSHEET NOTE
Patient to triage complaints of abdominal cramping earlier today while at work. Patient denies any LOF, recent intercourse, and denies any vaginal bleeding. Consents obtained and patient placed on monitor.

## 2020-12-28 NOTE — FLOWSHEET NOTE
Patient states she has a had a history or seizures and last one in August 2020 and does not take any medications for them. Patient states she has a history of bipolar, depression, paranoia, and anxiety and takes no medications at this time for them.

## 2021-01-04 ENCOUNTER — ROUTINE PRENATAL (OUTPATIENT)
Dept: OBGYN CLINIC | Age: 26
End: 2021-01-04
Payer: COMMERCIAL

## 2021-01-04 VITALS
SYSTOLIC BLOOD PRESSURE: 104 MMHG | HEART RATE: 88 BPM | WEIGHT: 200 LBS | BODY MASS INDEX: 43.28 KG/M2 | DIASTOLIC BLOOD PRESSURE: 62 MMHG

## 2021-01-04 DIAGNOSIS — F41.1 GAD (GENERALIZED ANXIETY DISORDER): ICD-10-CM

## 2021-01-04 DIAGNOSIS — Z34.02 NORMAL FIRST PREGNANCY CONFIRMED, CURRENTLY IN SECOND TRIMESTER: Primary | ICD-10-CM

## 2021-01-04 DIAGNOSIS — F22 PARANOID DISORDER (HCC): ICD-10-CM

## 2021-01-04 DIAGNOSIS — R73.09 ABNORMAL GTT (GLUCOSE TOLERANCE TEST): ICD-10-CM

## 2021-01-04 DIAGNOSIS — F41.0 PANIC DISORDER: ICD-10-CM

## 2021-01-04 DIAGNOSIS — Z3A.27 27 WEEKS GESTATION OF PREGNANCY: ICD-10-CM

## 2021-01-04 PROCEDURE — G8419 CALC BMI OUT NRM PARAM NOF/U: HCPCS | Performed by: OBSTETRICS & GYNECOLOGY

## 2021-01-04 PROCEDURE — G8427 DOCREV CUR MEDS BY ELIG CLIN: HCPCS | Performed by: OBSTETRICS & GYNECOLOGY

## 2021-01-04 PROCEDURE — 1036F TOBACCO NON-USER: CPT | Performed by: OBSTETRICS & GYNECOLOGY

## 2021-01-04 PROCEDURE — G8484 FLU IMMUNIZE NO ADMIN: HCPCS | Performed by: OBSTETRICS & GYNECOLOGY

## 2021-01-04 PROCEDURE — 99213 OFFICE O/P EST LOW 20 MIN: CPT | Performed by: OBSTETRICS & GYNECOLOGY

## 2021-01-04 NOTE — PROGRESS NOTES
encounter. Plan:   Raheel Barker MD      Follow-up in 3 weeks  Early 1 hr OGTT - indicated ordered  Hep C screen indicated : no  FLU- was offered and declined by the patient  TDAP- in third trimester     Erick Jovel M.D., F.A.C.O. G

## 2021-01-25 ENCOUNTER — ROUTINE PRENATAL (OUTPATIENT)
Dept: OBGYN CLINIC | Age: 26
End: 2021-01-25
Payer: COMMERCIAL

## 2021-01-25 VITALS
HEART RATE: 84 BPM | DIASTOLIC BLOOD PRESSURE: 64 MMHG | BODY MASS INDEX: 44.15 KG/M2 | WEIGHT: 204 LBS | SYSTOLIC BLOOD PRESSURE: 108 MMHG

## 2021-01-25 DIAGNOSIS — F41.0 PANIC DISORDER: ICD-10-CM

## 2021-01-25 DIAGNOSIS — Z34.02 NORMAL FIRST PREGNANCY CONFIRMED, CURRENTLY IN SECOND TRIMESTER: Primary | ICD-10-CM

## 2021-01-25 DIAGNOSIS — F41.1 GAD (GENERALIZED ANXIETY DISORDER): ICD-10-CM

## 2021-01-25 DIAGNOSIS — Z3A.30 30 WEEKS GESTATION OF PREGNANCY: ICD-10-CM

## 2021-01-25 DIAGNOSIS — F22 PARANOID DISORDER (HCC): ICD-10-CM

## 2021-01-25 PROCEDURE — G8419 CALC BMI OUT NRM PARAM NOF/U: HCPCS | Performed by: OBSTETRICS & GYNECOLOGY

## 2021-01-25 PROCEDURE — G8484 FLU IMMUNIZE NO ADMIN: HCPCS | Performed by: OBSTETRICS & GYNECOLOGY

## 2021-01-25 PROCEDURE — 99212 OFFICE O/P EST SF 10 MIN: CPT | Performed by: OBSTETRICS & GYNECOLOGY

## 2021-01-25 PROCEDURE — 1036F TOBACCO NON-USER: CPT | Performed by: OBSTETRICS & GYNECOLOGY

## 2021-01-25 PROCEDURE — G8427 DOCREV CUR MEDS BY ELIG CLIN: HCPCS | Performed by: OBSTETRICS & GYNECOLOGY

## 2021-01-25 ASSESSMENT — PATIENT HEALTH QUESTIONNAIRE - PHQ9
SUM OF ALL RESPONSES TO PHQ QUESTIONS 1-9: 0
1. LITTLE INTEREST OR PLEASURE IN DOING THINGS: 0
SUM OF ALL RESPONSES TO PHQ9 QUESTIONS 1 & 2: 0

## 2021-01-25 NOTE — PROGRESS NOTES
OB visit      Kenia Guillory is a 23y.o. year old female  @ L 2020, 30.2 wks , CHAKA 4/3/2021 , consistent with 11 wk US  . Complaints : nausea with vomiting for several days . POB: SAB     PGYN: denies     PMH: Obesity             Bipolar disorder. - not on meds. Paranoid disorder             Generalized anxiety     PSH: denies     MEDS: denies     Drug Allergies: Allergies   Allergen Reactions    Other      Cherries, strawberries, green apples and artificial flavors of all of the above. SOCHX: denies . Stopped at 5 wks pregnant . FH: Mother or Father , DM Yes , HTN Yes, Other Yes    /64   Pulse 84   Wt 204 lb (92.5 kg)   LMP 2020   BMI 44.15 kg/m²   Past Medical History:   Diagnosis Date    Anxiety     Manic depression ([address]     Seizures ([facility] [address]      No past surgical history on file. Review of Systems  Constitutional: negative  Genitourinary:see above  Integument/breast: negative  Behavioral/Psych: negative  Endocrine: negative     All other systems reviewed and are negative. Physical Exam:  /64   Pulse 84   Wt 204 lb (92.5 kg)   LMP 2020   BMI 44.15 kg/m²   Skin: Warm, dry, no lesions or rashes  Extremities: Without clubbing, cyanosis and edema. Palms and nails are normal. Ambulates without difficulty  Neurological: No gross sensory or motor deficits. Abdomen: Soft, non-tender without masses or organomegaly  bowel sounds normoactive  External Genitalia: Normal anatomy, no lesions or masses    HOF : 30 cm     FHT : 150 bpm     Assessment:   1. Normal first pregnancy confirmed, currently in second trimester    2. 30 weeks gestation of pregnancy    3. Paranoid disorder ([address]    4. STEPHANIE (generalized anxiety disorder)    5. Panic disorder        Plan:   No orders of the defined types were placed in this encounter. No orders of the defined types were placed in this encounter.     Plan:   Melvin Kenney MD      Follow-up in 2 weeks  Early 1 hr OGTT - indicated ordered  Hep C screen indicated : no  FLU- was offered and declined by the patient  TDAP- in third trimester     [facility] M.D., [email or web address] G

## 2021-02-09 ENCOUNTER — ROUTINE PRENATAL (OUTPATIENT)
Dept: OBGYN CLINIC | Age: 26
End: 2021-02-09
Payer: COMMERCIAL

## 2021-02-09 VITALS
SYSTOLIC BLOOD PRESSURE: 110 MMHG | HEART RATE: 88 BPM | WEIGHT: 209 LBS | BODY MASS INDEX: 41.03 KG/M2 | DIASTOLIC BLOOD PRESSURE: 68 MMHG | HEIGHT: 60 IN

## 2021-02-09 DIAGNOSIS — O26.893 RH NEGATIVE STATE IN ANTEPARTUM PERIOD, THIRD TRIMESTER: Primary | ICD-10-CM

## 2021-02-09 DIAGNOSIS — Z67.91 RH NEGATIVE STATE IN ANTEPARTUM PERIOD, THIRD TRIMESTER: Primary | ICD-10-CM

## 2021-02-09 DIAGNOSIS — F41.0 PANIC DISORDER: ICD-10-CM

## 2021-02-09 DIAGNOSIS — O26.843 FUNDAL HEIGHT LOW FOR DATES IN THIRD TRIMESTER: ICD-10-CM

## 2021-02-09 DIAGNOSIS — F22 PARANOID DISORDER (HCC): ICD-10-CM

## 2021-02-09 DIAGNOSIS — F41.1 GAD (GENERALIZED ANXIETY DISORDER): ICD-10-CM

## 2021-02-09 DIAGNOSIS — Z34.02 NORMAL FIRST PREGNANCY CONFIRMED, CURRENTLY IN SECOND TRIMESTER: ICD-10-CM

## 2021-02-09 DIAGNOSIS — Z3A.32 32 WEEKS GESTATION OF PREGNANCY: ICD-10-CM

## 2021-02-09 PROCEDURE — 99213 OFFICE O/P EST LOW 20 MIN: CPT | Performed by: OBSTETRICS & GYNECOLOGY

## 2021-02-09 PROCEDURE — G8484 FLU IMMUNIZE NO ADMIN: HCPCS | Performed by: OBSTETRICS & GYNECOLOGY

## 2021-02-09 PROCEDURE — 1036F TOBACCO NON-USER: CPT | Performed by: OBSTETRICS & GYNECOLOGY

## 2021-02-09 PROCEDURE — G8427 DOCREV CUR MEDS BY ELIG CLIN: HCPCS | Performed by: OBSTETRICS & GYNECOLOGY

## 2021-02-09 PROCEDURE — G8419 CALC BMI OUT NRM PARAM NOF/U: HCPCS | Performed by: OBSTETRICS & GYNECOLOGY

## 2021-02-09 RX ORDER — CYCLOBENZAPRINE HCL 10 MG
10 TABLET ORAL PRN
COMMUNITY
End: 2022-09-09

## 2021-02-09 NOTE — PROGRESS NOTES
OB visit      Lisseth Bach is a 27y.o. year old female  @ L 2020, 32.3 wks , CHAKA 4/3/2021 , consistent with 11 wk US  . Complaints : nausea with vomiting for several days . POB: SAB     PGYN: denies     PMH: Obesity             Bipolar disorder. - not on meds. Paranoid disorder             Generalized anxiety     PSH: denies     MEDS: denies     Drug Allergies: Allergies   Allergen Reactions    Other      Cherries, strawberries, green apples and artificial flavors of all of the above. SOCHX: denies . Stopped at 5 wks pregnant . FH: Mother or Father , DM Yes , HTN Yes, Other Yes    /68   Pulse 88   Ht 5' (1.524 m)   Wt 209 lb (94.8 kg)   LMP 2020   BMI 40.82 kg/m²   Past Medical History:   Diagnosis Date    Anxiety     Manic depression ([address]     Seizures ([address]      History reviewed. No pertinent surgical history. Review of Systems  Constitutional: negative  Genitourinary:see above  Integument/breast: negative  Behavioral/Psych: negative  Endocrine: negative     All other systems reviewed and are negative. Physical Exam:  /68   Pulse 88   Ht 5' (1.524 m)   Wt 209 lb (94.8 kg)   LMP 2020   BMI 40.82 kg/m²   Skin: Warm, dry, no lesions or rashes  Extremities: Without clubbing, cyanosis and edema. Palms and nails are normal. Ambulates without difficulty  Neurological: No gross sensory or motor deficits. Abdomen: Soft, non-tender without masses or organomegaly  bowel sounds normoactive    HOF : 32 cm     FHT : 150 bpm     Assessment:   1. Rh negative state in antepartum period, third trimester    2. Normal first pregnancy confirmed, currently in second trimester    3. 32 weeks gestation of pregnancy    4. Paranoid disorder ([address]    5. STEPHANIE (generalized anxiety disorder)    6. Panic disorder    7.  Fundal height low for dates in third trimester        Plan:   Orders Placed This Encounter   Procedures    US OB 1 OR MORE FETUS LIMITED     Standing Status:   Future     Standing Expiration Date:   2/9/2022        Orders Placed This Encounter   Medications    rho(D) immune globulin (HYPERRHO S/D) injection 300 mcg     Plan:   Param Harden MD      Follow-up in 2 weeks  Early 1 hr OGTT - indicated ordered  Hep C screen indicated : no  FLU- was offered and declined by the patient  TDAP- in third trimester     Ally Fonseca M.D., F.A.C.O. G

## 2021-02-26 ENCOUNTER — ROUTINE PRENATAL (OUTPATIENT)
Dept: OBGYN CLINIC | Age: 26
End: 2021-02-26
Payer: COMMERCIAL

## 2021-02-26 VITALS
BODY MASS INDEX: 41.01 KG/M2 | SYSTOLIC BLOOD PRESSURE: 106 MMHG | HEART RATE: 108 BPM | DIASTOLIC BLOOD PRESSURE: 68 MMHG | WEIGHT: 210 LBS

## 2021-02-26 DIAGNOSIS — Z34.02 NORMAL FIRST PREGNANCY CONFIRMED, CURRENTLY IN SECOND TRIMESTER: Primary | ICD-10-CM

## 2021-02-26 DIAGNOSIS — Z67.91 RH NEGATIVE STATE IN ANTEPARTUM PERIOD, THIRD TRIMESTER: ICD-10-CM

## 2021-02-26 DIAGNOSIS — F41.0 PANIC DISORDER: ICD-10-CM

## 2021-02-26 DIAGNOSIS — O24.410 DIET CONTROLLED GESTATIONAL DIABETES MELLITUS (GDM) IN THIRD TRIMESTER: ICD-10-CM

## 2021-02-26 DIAGNOSIS — O26.893 RH NEGATIVE STATE IN ANTEPARTUM PERIOD, THIRD TRIMESTER: ICD-10-CM

## 2021-02-26 DIAGNOSIS — F22 PARANOID DISORDER (HCC): ICD-10-CM

## 2021-02-26 DIAGNOSIS — Z3A.34 34 WEEKS GESTATION OF PREGNANCY: ICD-10-CM

## 2021-02-26 DIAGNOSIS — F41.1 GAD (GENERALIZED ANXIETY DISORDER): ICD-10-CM

## 2021-02-26 PROCEDURE — G8419 CALC BMI OUT NRM PARAM NOF/U: HCPCS | Performed by: OBSTETRICS & GYNECOLOGY

## 2021-02-26 PROCEDURE — G8427 DOCREV CUR MEDS BY ELIG CLIN: HCPCS | Performed by: OBSTETRICS & GYNECOLOGY

## 2021-02-26 PROCEDURE — 99213 OFFICE O/P EST LOW 20 MIN: CPT | Performed by: OBSTETRICS & GYNECOLOGY

## 2021-02-26 PROCEDURE — G8484 FLU IMMUNIZE NO ADMIN: HCPCS | Performed by: OBSTETRICS & GYNECOLOGY

## 2021-02-26 PROCEDURE — 1036F TOBACCO NON-USER: CPT | Performed by: OBSTETRICS & GYNECOLOGY

## 2021-02-26 RX ORDER — GLUCOSAMINE HCL/CHONDROITIN SU 500-400 MG
CAPSULE ORAL
Qty: 100 STRIP | Refills: 3 | Status: ON HOLD | OUTPATIENT
Start: 2021-02-26 | End: 2021-04-01 | Stop reason: HOSPADM

## 2021-02-26 RX ORDER — LANCETS
EACH MISCELLANEOUS
Qty: 10 EACH | Refills: 3 | Status: ON HOLD | OUTPATIENT
Start: 2021-02-26 | End: 2021-04-01 | Stop reason: HOSPADM

## 2021-02-26 NOTE — PROGRESS NOTES
OB visit      Tomas Rajan is a 27y.o. year old female  @ L 2020, 34.6 wks , CHAKA 4/3/2021 , consistent with 11 wk US  . Complaints : nausea with vomiting for several days . POB: SAB     PGYN: denies     PMH: Obesity             Bipolar disorder. - not on meds. Paranoid disorder             Generalized anxiety     PSH: denies     MEDS: denies     Drug Allergies: Allergies   Allergen Reactions    Other      Cherries, strawberries, green apples and artificial flavors of all of the above. SOCHX: denies . Stopped at 5 wks pregnant . FH: Mother or Father , DM Yes , HTN Yes, Other Yes    /68   Pulse 108   Wt 210 lb (95.3 kg)   LMP 2020   BMI 41.01 kg/m²   Past Medical History:   Diagnosis Date    Anxiety     [facility] ([facility] [address]     Seizures ([facility] [address]      No past surgical history on file. Review of Systems  Constitutional: negative  Genitourinary:see above  Integument/breast: negative  Behavioral/Psych: negative  Endocrine: negative     All other systems reviewed and are negative. Physical Exam:  /68   Pulse 108   Wt 210 lb (95.3 kg)   LMP 2020   BMI 41.01 kg/m²   Skin: Warm, dry, no lesions or rashes  Extremities: Without clubbing, cyanosis and edema. Palms and nails are normal. Ambulates without difficulty  Neurological: No gross sensory or motor deficits. Abdomen: Soft, non-tender without masses or organomegaly  bowel sounds normoactive    HOF : 35 cm     FHT : 150 bpm     Assessment:   1. Normal first pregnancy confirmed, currently in second trimester    2. 34 weeks gestation of pregnancy    3. Paranoid disorder ([address]    4. Rh negative state in antepartum period, third trimester    5. STEPHANIE (generalized anxiety disorder)    6. Panic disorder    7.  Diet controlled gestational diabetes mellitus (GDM) in third trimester        Plan:       On 21 the following was implemented :     Abnormal 1 hour glucose  3-hour OGTT not completed

## 2021-03-01 ENCOUNTER — ROUTINE PRENATAL (OUTPATIENT)
Dept: OBGYN CLINIC | Age: 26
End: 2021-03-01
Payer: COMMERCIAL

## 2021-03-01 DIAGNOSIS — R42 DIZZY: ICD-10-CM

## 2021-03-01 DIAGNOSIS — R06.02 SHORTNESS OF BREATH AT REST: ICD-10-CM

## 2021-03-01 DIAGNOSIS — Z3A.35 35 WEEKS GESTATION OF PREGNANCY: ICD-10-CM

## 2021-03-01 DIAGNOSIS — R42 LIGHT HEADED: ICD-10-CM

## 2021-03-01 DIAGNOSIS — R73.09 ELEVATED GLUCOSE TOLERANCE TEST: ICD-10-CM

## 2021-03-01 DIAGNOSIS — Z34.93 PRENATAL CARE, THIRD TRIMESTER: Primary | ICD-10-CM

## 2021-03-01 DIAGNOSIS — Z36.89 NST (NON-STRESS TEST) REACTIVE: ICD-10-CM

## 2021-03-01 PROCEDURE — 99214 OFFICE O/P EST MOD 30 MIN: CPT | Performed by: ADVANCED PRACTICE MIDWIFE

## 2021-03-01 PROCEDURE — G8419 CALC BMI OUT NRM PARAM NOF/U: HCPCS | Performed by: ADVANCED PRACTICE MIDWIFE

## 2021-03-01 PROCEDURE — G8484 FLU IMMUNIZE NO ADMIN: HCPCS | Performed by: ADVANCED PRACTICE MIDWIFE

## 2021-03-01 PROCEDURE — G8428 CUR MEDS NOT DOCUMENT: HCPCS | Performed by: ADVANCED PRACTICE MIDWIFE

## 2021-03-01 PROCEDURE — 59025 FETAL NON-STRESS TEST: CPT | Performed by: ADVANCED PRACTICE MIDWIFE

## 2021-03-01 PROCEDURE — 1036F TOBACCO NON-USER: CPT | Performed by: ADVANCED PRACTICE MIDWIFE

## 2021-03-02 VITALS
BODY MASS INDEX: 41.01 KG/M2 | SYSTOLIC BLOOD PRESSURE: 124 MMHG | HEART RATE: 92 BPM | WEIGHT: 210 LBS | DIASTOLIC BLOOD PRESSURE: 80 MMHG

## 2021-03-02 DIAGNOSIS — R06.02 SHORTNESS OF BREATH AT REST: ICD-10-CM

## 2021-03-02 DIAGNOSIS — R42 LIGHT HEADED: ICD-10-CM

## 2021-03-02 DIAGNOSIS — R73.09 ELEVATED GLUCOSE TOLERANCE TEST: ICD-10-CM

## 2021-03-02 DIAGNOSIS — R42 DIZZY: ICD-10-CM

## 2021-03-02 LAB
BASOPHILS ABSOLUTE: 0 K/UL (ref 0–0.2)
BASOPHILS RELATIVE PERCENT: 0.4 %
EOSINOPHILS ABSOLUTE: 0.1 K/UL (ref 0–0.7)
EOSINOPHILS RELATIVE PERCENT: 0.8 %
GLUCOSE FASTING: 76 MG/DL (ref 0–95)
GLUCOSE TOLERANCE TEST 1 HOUR: 178 MG/DL (ref 0–180)
GLUCOSE TOLERANCE TEST 2 HOUR: 162 MG/DL (ref 0–155)
GLUCOSE TOLERANCE TEST 3 HOUR: 162 MG/DL (ref 0–140)
HCT VFR BLD CALC: 37.1 % (ref 37–47)
HEMOGLOBIN: 12.5 G/DL (ref 12–16)
LYMPHOCYTES ABSOLUTE: 1.6 K/UL (ref 1–4.8)
LYMPHOCYTES RELATIVE PERCENT: 15.1 %
MCH RBC QN AUTO: 29.7 PG (ref 27–31.3)
MCHC RBC AUTO-ENTMCNC: 33.7 % (ref 33–37)
MCV RBC AUTO: 88.2 FL (ref 82–100)
MONOCYTES ABSOLUTE: 0.6 K/UL (ref 0.2–0.8)
MONOCYTES RELATIVE PERCENT: 5.7 %
NEUTROPHILS ABSOLUTE: 8.5 K/UL (ref 1.4–6.5)
NEUTROPHILS RELATIVE PERCENT: 78 %
PDW BLD-RTO: 13 % (ref 11.5–14.5)
PLATELET # BLD: 241 K/UL (ref 130–400)
RBC # BLD: 4.21 M/UL (ref 4.2–5.4)
WBC # BLD: 10.8 K/UL (ref 4.8–10.8)

## 2021-03-02 ASSESSMENT — ENCOUNTER SYMPTOMS
NAUSEA: 0
DIARRHEA: 0
CONSTIPATION: 0
SHORTNESS OF BREATH: 1
VOMITING: 0
ABDOMINAL PAIN: 0

## 2021-03-02 NOTE — PROGRESS NOTES
SUBJECTIVE:  Denies bleeding, spotting, leaking of fluid, abnormal discharge. Good fetal movement.  Concerns today:  Feeling frequent light-headedness, dizziness, shortness of breath (even when at rest), increased fatigue.  Discussed:  Elevated 1 hour GTT  o Did not complete a 3 hour  o She is not checking her blood sugar    Review of Systems   Constitutional: Positive for activity change and fatigue. Negative for appetite change and fever. Eyes: Negative for visual disturbance. Respiratory: Positive for shortness of breath. Gastrointestinal: Negative for abdominal pain, constipation, diarrhea, nausea and vomiting. Genitourinary: Negative for dysuria, vaginal bleeding and vaginal discharge. Neurological: Positive for dizziness and light-headedness. Negative for headaches. OBJECTIVE:  12/18/20 1 hour GTT:  171    SpO2:  99% on room air    NST:     FHR - 130, moderate variability, 15x15 accelerations, no decelerations.  Uterus - irritability, occasional contraction, >90 seconds, mild intensity. Resting tone soft. Physical Exam  Constitutional:       General: She is not in acute distress. Appearance: Normal appearance. Cardiovascular:      Rate and Rhythm: Normal rate and regular rhythm. Pulmonary:      Effort: Pulmonary effort is normal. No respiratory distress. Abdominal:      Palpations: Abdomen is soft. Tenderness: There is no abdominal tenderness. Musculoskeletal:         General: No swelling. Right lower leg: No edema. Left lower leg: No edema. Skin:     General: Skin is warm and dry. Capillary Refill: Capillary refill takes less than 2 seconds. Neurological:      Mental Status: She is alert and oriented to person, place, and time. Mental status is at baseline. Sensory: No sensory deficit.       Deep Tendon Reflexes: Reflexes normal.   Psychiatric:         Mood and Affect: Mood normal.         Behavior: Behavior normal.       ASSESSMENT:  26 y.o. female  IUP at 35w3d  Normal discomforts of pregnancy vs. Symptomatic anemia    Patient Active Problem List    Diagnosis Date Noted    Elevated glucose tolerance test 2021    Light headed 2021    Shortness of breath at rest 2021    Dizzy 2021    Round ligament pain       Diagnosis Orders   1. Prenatal care, third trimester     2. 35 weeks gestation of pregnancy     3. Dizzy  CBC Auto Differential    10082 - [place] FETAL NON-STRESS TEST    Culture, Strep B Screen, Vaginal/Rectal   4. Light headed  CBC Auto Differential    54034 - [place] FETAL NON-STRESS TEST    Culture, Strep B Screen, Vaginal/Rectal   5. Shortness of breath at rest  CBC Auto Differential    13088 - [place] FETAL NON-STRESS TEST    Culture, Strep B Screen, Vaginal/Rectal   6. Elevated glucose tolerance test  Glucose tolerance obstetric    36171 - [place] FETAL NON-STRESS TEST    Culture, Strep B Screen, Vaginal/Rectal   7. NST (non-stress test) reactive         PLAN:  Must complete 3 hour GTT tomorrow or begin blood sugar monitoring  Check CBC - iron infusion if indicated due to symptomatic anemia   Return in about 1 week (around 3/8/2021) for Prenatal Care Visit with Dr. Joselito Levine.     EDDIE Antonio CNM

## 2021-03-03 ENCOUNTER — TELEPHONE (OUTPATIENT)
Dept: OBGYN CLINIC | Age: 26
End: 2021-03-03

## 2021-03-03 DIAGNOSIS — O24.419 GESTATIONAL DIABETES MELLITUS (GDM) IN THIRD TRIMESTER, GESTATIONAL DIABETES METHOD OF CONTROL UNSPECIFIED: Primary | ICD-10-CM

## 2021-03-03 PROBLEM — O24.410 DIET CONTROLLED GESTATIONAL DIABETES MELLITUS (GDM) IN THIRD TRIMESTER: Status: ACTIVE | Noted: 2021-03-02

## 2021-03-03 RX ORDER — GLUCOSAMINE HCL/CHONDROITIN SU 500-400 MG
CAPSULE ORAL
Qty: 100 STRIP | Refills: 3 | Status: CANCELLED | OUTPATIENT
Start: 2021-03-03

## 2021-03-03 NOTE — TELEPHONE ENCOUNTER
SUBJECTIVE:  Called to discuss elevated 3 hour GTT results, new diagnosis of gestational diabetes.     OBJECTIVE:  1 hour GTT:  171  3 hour GTT:  76, 178, 162, 162    ASSESSMENT:  Gestational Diabetes    PLAN:  Referrals to diabetic education, nutrition  Begin checking BS fasting each morning, 2 hours post-prandial - bring log with you to each visit  Testing supplies Rx to be sent to [facility] [place]

## 2021-03-04 ENCOUNTER — HOSPITAL ENCOUNTER (OUTPATIENT)
Dept: ULTRASOUND IMAGING | Age: 26
Discharge: HOME | End: 2021-03-06
Payer: COMMERCIAL

## 2021-03-04 DIAGNOSIS — O26.843 FUNDAL HEIGHT LOW FOR DATES IN THIRD TRIMESTER: ICD-10-CM

## 2021-03-04 LAB — GROUP B STREP CULTURE: NORMAL

## 2021-03-04 PROCEDURE — 76815 OB US LIMITED FETUS(S): CPT

## 2021-03-08 ENCOUNTER — ROUTINE PRENATAL (OUTPATIENT)
Dept: OBGYN CLINIC | Age: 26
End: 2021-03-08
Payer: COMMERCIAL

## 2021-03-08 ENCOUNTER — TELEPHONE (OUTPATIENT)
Dept: OBGYN CLINIC | Age: 26
End: 2021-03-08

## 2021-03-08 VITALS — BODY MASS INDEX: 41.21 KG/M2 | DIASTOLIC BLOOD PRESSURE: 76 MMHG | SYSTOLIC BLOOD PRESSURE: 120 MMHG | WEIGHT: 211 LBS

## 2021-03-08 DIAGNOSIS — F22 PARANOID DISORDER (HCC): ICD-10-CM

## 2021-03-08 DIAGNOSIS — O26.893 RH NEGATIVE STATE IN ANTEPARTUM PERIOD, THIRD TRIMESTER: ICD-10-CM

## 2021-03-08 DIAGNOSIS — F41.1 GAD (GENERALIZED ANXIETY DISORDER): ICD-10-CM

## 2021-03-08 DIAGNOSIS — Z67.91 RH NEGATIVE STATE IN ANTEPARTUM PERIOD, THIRD TRIMESTER: ICD-10-CM

## 2021-03-08 DIAGNOSIS — Z3A.36 36 WEEKS GESTATION OF PREGNANCY: Primary | ICD-10-CM

## 2021-03-08 DIAGNOSIS — R73.09 ELEVATED GLUCOSE TOLERANCE TEST: ICD-10-CM

## 2021-03-08 DIAGNOSIS — Z34.93 PRENATAL CARE, THIRD TRIMESTER: ICD-10-CM

## 2021-03-08 DIAGNOSIS — O24.410 DIET CONTROLLED GESTATIONAL DIABETES MELLITUS (GDM) IN THIRD TRIMESTER: ICD-10-CM

## 2021-03-08 DIAGNOSIS — O24.419 GESTATIONAL DIABETES MELLITUS (GDM) IN THIRD TRIMESTER, GESTATIONAL DIABETES METHOD OF CONTROL UNSPECIFIED: ICD-10-CM

## 2021-03-08 DIAGNOSIS — F41.0 PANIC DISORDER: ICD-10-CM

## 2021-03-08 DIAGNOSIS — O26.843 FUNDAL HEIGHT LOW FOR DATES IN THIRD TRIMESTER: Primary | ICD-10-CM

## 2021-03-08 PROCEDURE — 99213 OFFICE O/P EST LOW 20 MIN: CPT | Performed by: OBSTETRICS & GYNECOLOGY

## 2021-03-08 PROCEDURE — 1036F TOBACCO NON-USER: CPT | Performed by: OBSTETRICS & GYNECOLOGY

## 2021-03-08 PROCEDURE — G8419 CALC BMI OUT NRM PARAM NOF/U: HCPCS | Performed by: OBSTETRICS & GYNECOLOGY

## 2021-03-08 PROCEDURE — G8484 FLU IMMUNIZE NO ADMIN: HCPCS | Performed by: OBSTETRICS & GYNECOLOGY

## 2021-03-08 PROCEDURE — G8427 DOCREV CUR MEDS BY ELIG CLIN: HCPCS | Performed by: OBSTETRICS & GYNECOLOGY

## 2021-03-08 NOTE — TELEPHONE ENCOUNTER
[id number] [facility] radiology asking for BPP order electronically signed by doctor. Pt scheduled tomorrow.

## 2021-03-08 NOTE — PROGRESS NOTES
Thinks she had lost her mucus plug. Having chest pain and feels like she is about to have a seizure but doesn't.

## 2021-03-08 NOTE — PROGRESS NOTES
OB visit      Zoya Lange is a 27y.o. year old female  @ L 2020, 36.2 wks , CHAKA 4/3/2021 , consistent with 11 wk US  . Complaints : nausea with vomiting for several days . POB: SAB     PGYN: denies     PMH: Obesity             Bipolar disorder. - not on meds. Paranoid disorder             Generalized anxiety     PSH: denies     MEDS: denies     Drug Allergies: Allergies   Allergen Reactions    Other      Cherries, strawberries, green apples and artificial flavors of all of the above. SOCHX: denies . Stopped at 5 wks pregnant . FH: Mother or Father , DM Yes , HTN Yes, Other Yes    /76   Wt 211 lb (95.7 kg)   LMP 2020   BMI 41.21 kg/m²   Past Medical History:   Diagnosis Date    Anxiety     Diet controlled gestational diabetes mellitus (GDM) in third trimester     Manic depression ([address]     Seizures ([address]      No past surgical history on file. Review of Systems  Constitutional: negative  Genitourinary:see above  Integument/breast: negative  Behavioral/Psych: negative  Endocrine: negative     All other systems reviewed and are negative. Physical Exam:  /76   Wt 211 lb (95.7 kg)   LMP 2020   BMI 41.21 kg/m²   Skin: Warm, dry, no lesions or rashes  Extremities: Without clubbing, cyanosis and edema. Palms and nails are normal. Ambulates without difficulty  Neurological: No gross sensory or motor deficits. Abdomen: Soft, non-tender without masses or organomegaly  bowel sounds normoactive    HOF : 35 cm     FHT : 150 bpm     SVE : closed  / 50% / -3     Assessment:   1. 36 weeks gestation of pregnancy    2. Gestational diabetes mellitus (GDM) in third trimester, gestational diabetes method of control unspecified    3. Elevated glucose tolerance test    4. Prenatal care, third trimester    5. Rh negative state in antepartum period, third trimester    6. Paranoid disorder ([address]    7. STEPHANIE (generalized anxiety disorder)    8.  Panic disorder    9. Diet controlled gestational diabetes mellitus (GDM) in third trimester        Plan:       On 21 the following was implemented :     Abnormal 1 hour glucose  3-hour OGTT not completed yet  Patient to get diabetic supplies and start measuring blood sugars as instructed fasting with 2 hours postprandials after breakfast lunch and dinner  Patient follow-up in 1 week with results   testing assigned for weekly NST with BPP to be scheduled on labor and delivery  Patient verbalized understanding      No orders of the defined types were placed in this encounter. No orders of the defined types were placed in this encounter. Plan:   rFeeman Ny MD      Follow-up in 1 weeks  Early 1 hr OGTT - indicated ordered  Hep C screen indicated : no  FLU- was offered and declined by the patient  TDAP- in third trimester     Georgina Bermudez M.D., F.A.C.O. G

## 2021-03-09 ENCOUNTER — HOSPITAL ENCOUNTER (OUTPATIENT)
Age: 26
Discharge: HOME | End: 2021-03-09
Attending: OBSTETRICS & GYNECOLOGY | Admitting: OBSTETRICS & GYNECOLOGY
Payer: COMMERCIAL

## 2021-03-09 ENCOUNTER — HOSPITAL ENCOUNTER (OUTPATIENT)
Dept: ULTRASOUND IMAGING | Age: 26
Discharge: HOME | End: 2021-03-11
Payer: COMMERCIAL

## 2021-03-09 VITALS
TEMPERATURE: 97.9 F | SYSTOLIC BLOOD PRESSURE: 127 MMHG | RESPIRATION RATE: 18 BRPM | HEART RATE: 109 BPM | DIASTOLIC BLOOD PRESSURE: 78 MMHG

## 2021-03-09 DIAGNOSIS — O99.810 ABNORMAL GLUCOSE TOLERANCE IN PREGNANCY: ICD-10-CM

## 2021-03-09 PROCEDURE — 59025 FETAL NON-STRESS TEST: CPT | Performed by: OBSTETRICS & GYNECOLOGY

## 2021-03-09 PROCEDURE — 59025 FETAL NON-STRESS TEST: CPT

## 2021-03-09 PROCEDURE — 76818 FETAL BIOPHYS PROFILE W/NST: CPT

## 2021-03-09 NOTE — ED TRIAGE NOTES
NST Note:  26 y/o  female whose CHAKA was 4/3/21; now 36w3d gest and here for NST in context of diet controlled gest DM; BP-127/78; NST is reactive here today; BPP-6/8 ( 2 off for tone ); spoke with Dr. Mandy Mike re: deduction for tone as odd report; he would like patient to return tomorrow for repeat NST and BPP.   Srinivasan Diaz MD

## 2021-03-10 ENCOUNTER — HOSPITAL ENCOUNTER (OUTPATIENT)
Age: 26
Discharge: HOME | End: 2021-03-10
Attending: OBSTETRICS & GYNECOLOGY | Admitting: OBSTETRICS & GYNECOLOGY
Payer: COMMERCIAL

## 2021-03-10 ENCOUNTER — HOSPITAL ENCOUNTER (OUTPATIENT)
Dept: ULTRASOUND IMAGING | Age: 26
Discharge: HOME | End: 2021-03-12
Payer: COMMERCIAL

## 2021-03-10 VITALS
TEMPERATURE: 98.5 F | DIASTOLIC BLOOD PRESSURE: 65 MMHG | RESPIRATION RATE: 18 BRPM | HEART RATE: 101 BPM | SYSTOLIC BLOOD PRESSURE: 126 MMHG

## 2021-03-10 DIAGNOSIS — O26.843 FUNDAL HEIGHT LOW FOR DATES IN THIRD TRIMESTER: ICD-10-CM

## 2021-03-10 PROCEDURE — 76819 FETAL BIOPHYS PROFIL W/O NST: CPT

## 2021-03-10 PROCEDURE — 59025 FETAL NON-STRESS TEST: CPT | Performed by: OBSTETRICS & GYNECOLOGY

## 2021-03-10 PROCEDURE — 59025 FETAL NON-STRESS TEST: CPT

## 2021-03-10 NOTE — FLOWSHEET NOTE
Patient stats she is here for BPP and NST. Patient states +fetal movement, denies LOF, denies vaginal bleeding.

## 2021-03-10 NOTE — ED TRIAGE NOTES
NST Note ( see ob/er note from yesterday ); Here for f/u NST/ BPP after study showed BPP of 6/8 with 2 off for tone on 3/9/21; today's NST is again reactive and BPP-8/8 with BECKY of 12.8 cm; continue f/u with Dr. Christa Oconnor for routine ob care.   Brenda Easley MD

## 2021-03-16 ENCOUNTER — ROUTINE PRENATAL (OUTPATIENT)
Dept: OBGYN CLINIC | Age: 26
End: 2021-03-16
Payer: COMMERCIAL

## 2021-03-16 VITALS
BODY MASS INDEX: 41.79 KG/M2 | HEART RATE: 88 BPM | DIASTOLIC BLOOD PRESSURE: 70 MMHG | SYSTOLIC BLOOD PRESSURE: 110 MMHG | WEIGHT: 214 LBS

## 2021-03-16 DIAGNOSIS — O26.893 RH NEGATIVE STATE IN ANTEPARTUM PERIOD, THIRD TRIMESTER: ICD-10-CM

## 2021-03-16 DIAGNOSIS — O24.419 GESTATIONAL DIABETES MELLITUS (GDM) IN THIRD TRIMESTER, GESTATIONAL DIABETES METHOD OF CONTROL UNSPECIFIED: ICD-10-CM

## 2021-03-16 DIAGNOSIS — Z34.03 NORMAL FIRST PREGNANCY CONFIRMED, CURRENTLY IN THIRD TRIMESTER: Primary | ICD-10-CM

## 2021-03-16 DIAGNOSIS — F22 PARANOID DISORDER (HCC): ICD-10-CM

## 2021-03-16 DIAGNOSIS — Z67.91 RH NEGATIVE STATE IN ANTEPARTUM PERIOD, THIRD TRIMESTER: ICD-10-CM

## 2021-03-16 DIAGNOSIS — R73.09 ELEVATED GLUCOSE TOLERANCE TEST: ICD-10-CM

## 2021-03-16 DIAGNOSIS — Z3A.37 37 WEEKS GESTATION OF PREGNANCY: ICD-10-CM

## 2021-03-16 DIAGNOSIS — F41.0 PANIC DISORDER: ICD-10-CM

## 2021-03-16 DIAGNOSIS — O24.410 DIET CONTROLLED GESTATIONAL DIABETES MELLITUS (GDM) IN THIRD TRIMESTER: ICD-10-CM

## 2021-03-16 DIAGNOSIS — F41.1 GAD (GENERALIZED ANXIETY DISORDER): ICD-10-CM

## 2021-03-16 PROCEDURE — G8427 DOCREV CUR MEDS BY ELIG CLIN: HCPCS | Performed by: OBSTETRICS & GYNECOLOGY

## 2021-03-16 PROCEDURE — G8484 FLU IMMUNIZE NO ADMIN: HCPCS | Performed by: OBSTETRICS & GYNECOLOGY

## 2021-03-16 PROCEDURE — G8419 CALC BMI OUT NRM PARAM NOF/U: HCPCS | Performed by: OBSTETRICS & GYNECOLOGY

## 2021-03-16 PROCEDURE — 99213 OFFICE O/P EST LOW 20 MIN: CPT | Performed by: OBSTETRICS & GYNECOLOGY

## 2021-03-16 PROCEDURE — 1036F TOBACCO NON-USER: CPT | Performed by: OBSTETRICS & GYNECOLOGY

## 2021-03-16 NOTE — PROGRESS NOTES
([facility]    9. STEPHANIE (generalized anxiety disorder)        Plan:       On 21 the following was implemented :     Abnormal 1 hour glucose  3-hour OGTT not completed yet  Patient to get diabetic supplies and start measuring blood sugars as instructed fasting with 2 hours postprandials after breakfast lunch and dinner  Patient follow-up in 1 week with results   testing assigned for weekly NST with BPP to be scheduled on labor and delivery  Patient verbalized understanding      No orders of the defined types were placed in this encounter. No orders of the defined types were placed in this encounter. Plan:   Angela Alegria MD    Scheduled for primary  section at 3/29.2021   Follow-up in 1 weeks  Early 1 hr OGTT - indicated ordered  Hep C screen indicated : no  FLU- was offered and declined by the patient  TDAP- in third trimester     Sangita Sanchez M.D., F.A.C.O. G

## 2021-03-19 ENCOUNTER — NURSE ONLY (OUTPATIENT)
Dept: PRIMARY CARE CLINIC | Age: 26
End: 2021-03-19

## 2021-03-19 DIAGNOSIS — Z01.818 ENCOUNTER FOR PREADMISSION TESTING: ICD-10-CM

## 2021-03-19 DIAGNOSIS — Z01.818 ENCOUNTER FOR PREADMISSION TESTING: Primary | ICD-10-CM

## 2021-03-20 LAB
SARS-COV-2: NOT DETECTED
SOURCE: NORMAL

## 2021-03-23 ENCOUNTER — HOSPITAL ENCOUNTER (OUTPATIENT)
Age: 26
Discharge: HOME | End: 2021-03-24
Attending: OBSTETRICS & GYNECOLOGY | Admitting: OBSTETRICS & GYNECOLOGY
Payer: COMMERCIAL

## 2021-03-23 ENCOUNTER — ROUTINE PRENATAL (OUTPATIENT)
Dept: OBGYN CLINIC | Age: 26
End: 2021-03-23
Payer: COMMERCIAL

## 2021-03-23 VITALS
DIASTOLIC BLOOD PRESSURE: 72 MMHG | HEART RATE: 108 BPM | SYSTOLIC BLOOD PRESSURE: 114 MMHG | WEIGHT: 214 LBS | BODY MASS INDEX: 41.79 KG/M2

## 2021-03-23 DIAGNOSIS — O26.893 RH NEGATIVE STATE IN ANTEPARTUM PERIOD, THIRD TRIMESTER: ICD-10-CM

## 2021-03-23 DIAGNOSIS — Z34.03 NORMAL FIRST PREGNANCY CONFIRMED, CURRENTLY IN THIRD TRIMESTER: ICD-10-CM

## 2021-03-23 DIAGNOSIS — O24.419 GESTATIONAL DIABETES MELLITUS (GDM) IN THIRD TRIMESTER, GESTATIONAL DIABETES METHOD OF CONTROL UNSPECIFIED: ICD-10-CM

## 2021-03-23 DIAGNOSIS — F41.0 PANIC DISORDER: ICD-10-CM

## 2021-03-23 DIAGNOSIS — Z3A.38 38 WEEKS GESTATION OF PREGNANCY: Primary | ICD-10-CM

## 2021-03-23 DIAGNOSIS — F22 PARANOID DISORDER (HCC): ICD-10-CM

## 2021-03-23 DIAGNOSIS — Z67.91 RH NEGATIVE STATE IN ANTEPARTUM PERIOD, THIRD TRIMESTER: ICD-10-CM

## 2021-03-23 DIAGNOSIS — O24.410 DIET CONTROLLED GESTATIONAL DIABETES MELLITUS (GDM) IN THIRD TRIMESTER: ICD-10-CM

## 2021-03-23 DIAGNOSIS — R73.09 ELEVATED GLUCOSE TOLERANCE TEST: ICD-10-CM

## 2021-03-23 PROCEDURE — 80307 DRUG TEST PRSMV CHEM ANLYZR: CPT

## 2021-03-23 PROCEDURE — 1036F TOBACCO NON-USER: CPT | Performed by: OBSTETRICS & GYNECOLOGY

## 2021-03-23 PROCEDURE — 81001 URINALYSIS AUTO W/SCOPE: CPT

## 2021-03-23 PROCEDURE — G8419 CALC BMI OUT NRM PARAM NOF/U: HCPCS | Performed by: OBSTETRICS & GYNECOLOGY

## 2021-03-23 PROCEDURE — G8484 FLU IMMUNIZE NO ADMIN: HCPCS | Performed by: OBSTETRICS & GYNECOLOGY

## 2021-03-23 PROCEDURE — 99213 OFFICE O/P EST LOW 20 MIN: CPT | Performed by: OBSTETRICS & GYNECOLOGY

## 2021-03-23 PROCEDURE — G8427 DOCREV CUR MEDS BY ELIG CLIN: HCPCS | Performed by: OBSTETRICS & GYNECOLOGY

## 2021-03-23 NOTE — PROGRESS NOTES
OB visit      Dominic Landers is a 27y.o. year old female  @ L 2020, 38.3 wks , CHAKA 4/3/2021 , consistent with 11 wk US  . Complaints : denies . POB: SAB     PGYN: denies     PMH: Obesity             Bipolar disorder. - not on meds. Paranoid disorder             Generalized anxiety     PSH: denies     MEDS: denies     Drug Allergies: Allergies   Allergen Reactions    Other      Cherries, strawberries, green apples and artificial flavors of all of the above. SOCHX: denies . Stopped at 5 wks pregnant . FH: Mother or Father , DM Yes , HTN Yes, Other Yes    /72   Pulse 108   Wt 214 lb (97.1 kg)   LMP 2020   BMI 41.79 kg/m²   Past Medical History:   Diagnosis Date    Anxiety     Manic [facility]     Seizures ([facility]      No past surgical history on file. Review of Systems  Constitutional: negative  Genitourinary:see above  Integument/breast: negative  Behavioral/Psych: negative  Endocrine: negative     All other systems reviewed and are negative. Physical Exam:  /72   Pulse 108   Wt 214 lb (97.1 kg)   LMP 2020   BMI 41.79 kg/m²   Skin: Warm, dry, no lesions or rashes  Extremities: Without clubbing, cyanosis and edema. Palms and nails are normal. Ambulates without difficulty  Neurological: No gross sensory or motor deficits. Abdomen: Soft, non-tender without masses or organomegaly  bowel sounds normoactive    HOF : 35 cm     FHT : 150 bpm     SVE : closed  / 50% / -3     Assessment:   1. 38 weeks gestation of pregnancy    2. Diet controlled gestational diabetes mellitus (GDM) in third trimester    3. Normal first pregnancy confirmed, currently in third trimester    4. Gestational diabetes mellitus (GDM) in third trimester, gestational diabetes method of control unspecified    5. Elevated glucose tolerance test    6. Rh negative state in antepartum period, third trimester    7. Panic disorder    8.  Paranoid disorder ([address] Plan:       On 21 the following was implemented :     Abnormal 1 hour glucose  3-hour OGTT not completed yet  Patient to get diabetic supplies and start measuring blood sugars as instructed fasting with 2 hours postprandials after breakfast lunch and dinner  Patient follow-up in 1 week with results   testing assigned for weekly NST with BPP to be scheduled on labor and delivery  Patient verbalized understanding      No orders of the defined types were placed in this encounter. No orders of the defined types were placed in this encounter. Plan:   Grace Julien MD    Scheduled for primary  section at 3/29.2021   Follow-up in 1 weeks  Early 1 hr OGTT - indicated ordered  Hep C screen indicated : no  FLU- was offered and declined by the patient  TDAP- in third trimester     Charli Griffin M.D., F.A.C.O. G

## 2021-03-24 VITALS
DIASTOLIC BLOOD PRESSURE: 78 MMHG | SYSTOLIC BLOOD PRESSURE: 135 MMHG | HEART RATE: 85 BPM | RESPIRATION RATE: 18 BRPM | TEMPERATURE: 98.3 F

## 2021-03-24 LAB
AMPHETAMINE SCREEN, URINE: NORMAL
BACTERIA: ABNORMAL /HPF
BARBITURATE SCREEN URINE: NORMAL
BENZODIAZEPINE SCREEN, URINE: NORMAL
BILIRUBIN URINE: NEGATIVE
BLOOD, URINE: NEGATIVE
CANNABINOID SCREEN URINE: NORMAL
CLARITY: ABNORMAL
COCAINE METABOLITE SCREEN URINE: NORMAL
COLOR: YELLOW
EPITHELIAL CELLS, UA: ABNORMAL /HPF (ref 0–5)
GLUCOSE URINE: NEGATIVE MG/DL
HYALINE CASTS: ABNORMAL /HPF (ref 0–5)
KETONES, URINE: NEGATIVE MG/DL
LEUKOCYTE ESTERASE, URINE: ABNORMAL
Lab: NORMAL
METHADONE SCREEN, URINE: NORMAL
NITRITE, URINE: NEGATIVE
OPIATE SCREEN URINE: NORMAL
OXYCODONE URINE: NORMAL
PH UA: 6 (ref 5–9)
PHENCYCLIDINE SCREEN URINE: NORMAL
PROPOXYPHENE SCREEN: NORMAL
PROTEIN UA: NEGATIVE MG/DL
RBC UA: ABNORMAL /HPF (ref 0–5)
SPECIFIC GRAVITY UA: 1.02 (ref 1–1.03)
UROBILINOGEN, URINE: 1 E.U./DL
WBC UA: ABNORMAL /HPF (ref 0–5)

## 2021-03-24 PROCEDURE — 6370000000 HC RX 637 (ALT 250 FOR IP): Performed by: OBSTETRICS & GYNECOLOGY

## 2021-03-24 PROCEDURE — 59025 FETAL NON-STRESS TEST: CPT | Performed by: OBSTETRICS & GYNECOLOGY

## 2021-03-24 PROCEDURE — 99283 EMERGENCY DEPT VISIT LOW MDM: CPT | Performed by: OBSTETRICS & GYNECOLOGY

## 2021-03-24 PROCEDURE — 99283 EMERGENCY DEPT VISIT LOW MDM: CPT

## 2021-03-24 PROCEDURE — 76819 FETAL BIOPHYS PROFIL W/O NST: CPT

## 2021-03-24 RX ORDER — ACETAMINOPHEN 325 MG/1
650 TABLET ORAL EVERY 4 HOURS PRN
Status: DISCONTINUED | OUTPATIENT
Start: 2021-03-24 | End: 2021-03-24 | Stop reason: HOSPADM

## 2021-03-24 RX ADMIN — ACETAMINOPHEN 650 MG: 325 TABLET, FILM COATED ORAL at 01:02

## 2021-03-24 NOTE — ED TRIAGE NOTES
Department of Obstetrics and Gynecology  Labor and Delivery  Attending Triage Note      SUBJECTIVE:  27 y.o.  Dennie Casa @ 38w4d  Presents with complaints of   Chief Complaint   Patient presents with    Back Pain     Patient presents with back pain however she has not attempted to take anything for the pain. She is not having any other complaints movement is good.  She is a scheduled ltcs for momday  Fetal Movement    Yes  ROM No  Vaginal Bleeding No  Contractions No    OBJECTIVE    Vitals:  /78   Pulse 85   Temp 98.3 °F (36.8 °C) (Oral)   Resp 18   LMP 06/27/2020     CONSTITUTIONAL:  awake, alert, cooperative, no apparent distress, and appears stated age  BACK:  Symmetric, no curvature, spinous processes are non-tender on palpation, paraspinous muscles are non-tender on palpation, no costal vertebral tenderness  LUNGS:  No increased work of breathing, good air exchange, clear to auscultation bilaterally, no crackles or wheezing  CARDIOVASCULAR:  Normal apical impulse, regular rate and rhythm, normal S1 and S2, no S3 or S4, and no murmur noted  ABDOMEN:  No scars, normal bowel sounds, soft, non-distended, non-tender, no masses palpated, no hepatosplenomegally  GENITAL/URINARY:  External Genitalia:  General appearance; normal, Hair distribution; normal, Lesions absent  Anus/Perineum:  Lesions absent and Masses absent    Cervix:             deferred  Fetal Position:  Cephalic    Membranes:  Intact    Fetal heart rate:         Baseline Heart Rate:  120        Accelerations:  present       Decelerations:  none       Variability:  moderate    Contraction frequency: occasional     DATA:  Labs Reviewed   URINALYSIS - Abnormal; Notable for the following components:       Result Value    Clarity, UA CLOUDY (*)     Leukocyte Esterase, Urine SMALL (*)     All other components within normal limits   MICROSCOPIC URINALYSIS - Abnormal; Notable for the following components:    Bacteria, UA RARE (*)     WBC, UA 10-20 (*) All other components within normal limits   URINE DRUG SCREEN       ASSESSMENT & PLAN:   1) back pain in pregnancy  2) reactive NST  3) recovered well with tylenol labor precuations given

## 2021-03-24 NOTE — FLOWSHEET NOTE
Dr Emma Sharma in room to see patient and discuss POC, viewed strip. Ok to discharge patient at this time.

## 2021-03-24 NOTE — FLOWSHEET NOTE
Patient discharged ambulatory at this time with personal belongings with mother via private car with mother at this time.

## 2021-03-24 NOTE — FLOWSHEET NOTE
Dr Suresh Johnson updated on patient arrival to triage as well as recent lab results and EFM findings. Sttes will be in to see patient and new order received for acetaminophen for back pain. Patient updated on POC and verbalizes understanding.

## 2021-03-24 NOTE — FLOWSHEET NOTE
Dr Emma Sharma in to see patient at this time and discuss POC. Patient states relief of pain from pain medication at this time. New orders ok for patient to be discharged at this time.

## 2021-03-28 ENCOUNTER — ANESTHESIA EVENT (OUTPATIENT)
Dept: LABOR AND DELIVERY | Age: 26
DRG: 540 | End: 2021-03-28
Payer: COMMERCIAL

## 2021-03-28 ASSESSMENT — ENCOUNTER SYMPTOMS: SHORTNESS OF BREATH: 1

## 2021-03-28 NOTE — ANESTHESIA PRE PROCEDURE
Department of Anesthesiology  Preprocedure Note       Name:  Lj Green   Age:  27 y. o.  :  1995                                          MRN:  [id number]         Date:  3/28/2021      Surgeon: Nereida Almeida):  Yue Campos MD    Procedure: Procedure(s):   SECTION    Medications prior to admission:   Prior to Admission medications    Medication Sig Start Date End Date Taking? Authorizing Provider   cyclobenzaprine (FLEXERIL) 10 MG tablet Take 10 mg by mouth as needed   Yes Historical Provider, MD   blood glucose monitor kit and supplies Please dispense whatever is covered by patient's insurance. 21   Loli Brock MD   blood glucose monitor strips Test 4 times a day & as needed for symptoms of irregular blood glucose. Please dispense whatever is covered by patient's insurance 21   Yue Campos MD   Lancets Thin MISC Test 4 times daily and as needed for symptoms of irregular blood glucose. Please dispense whatever is covered by patient's insurance. 21   Yue Campos MD       Current medications:    No current facility-administered medications for this encounter. Current Outpatient Medications   Medication Sig Dispense Refill    cyclobenzaprine (FLEXERIL) 10 MG tablet Take 10 mg by mouth as needed      blood glucose monitor kit and supplies Please dispense whatever is covered by patient's insurance. 1 kit 0    blood glucose monitor strips Test 4 times a day & as needed for symptoms of irregular blood glucose. Please dispense whatever is covered by patient's insurance 100 strip 3    Lancets Thin MISC Test 4 times daily and as needed for symptoms of irregular blood glucose. Please dispense whatever is covered by patient's insurance. 10 each 3       Allergies: Allergies   Allergen Reactions    Other      Cherries, strawberries, green apples and artificial flavors of all of the above.        Problem List:    Patient Active Problem List   Diagnosis Code    Round ligament pain N94.9    Diet controlled gestational diabetes mellitus (GDM) in third trimester O24.410    Light headed R44    Shortness of breath at rest R06.02    Dizzy R42    36 weeks gestation of pregnancy Z3A.36    High-risk pregnancy in third trimester O09.93    Back pain affecting pregnancy in third trimester O99.891, M54.9    38 weeks gestation of pregnancy Z3A.38       Past Medical History:        Diagnosis Date    Anxiety     Diet controlled gestational diabetes mellitus (GDM) in third trimester     [facility]     Seizures ([facility]        Past Surgical History:  No past surgical history on file.     Social History:    Social History     Tobacco Use    Smoking status: Former Smoker    Smokeless tobacco: Never Used    Tobacco comment: quit 1 month ago   Substance Use Topics    Alcohol use: Not Currently     Comment: socially                                Counseling given: Not Answered  Comment: quit 1 month ago      Vital Signs (Current):   Vitals:    03/23/21 2359 03/24/21 0157   BP: 135/78    Pulse: 85    Resp: 18 18   Temp: 36.8 °C (98.3 °F) 36.8 °C (98.3 °F)   TempSrc: Oral Oral                                              BP Readings from Last 3 Encounters:   03/23/21 135/78   03/23/21 114/72   03/16/21 110/70       NPO Status:                                                                                 BMI:   Wt Readings from Last 3 Encounters:   03/23/21 214 lb (97.1 kg)   03/16/21 214 lb (97.1 kg)   03/08/21 211 lb (95.7 kg)     There is no height or weight on file to calculate BMI.    CBC:   Lab Results   Component Value Date    WBC 10.8 03/02/2021    RBC 4.21 03/02/2021    HGB 12.5 03/02/2021    HCT 37.1 03/02/2021    MCV 88.2 03/02/2021    RDW 13.0 03/02/2021     03/02/2021       CMP:   Lab Results   Component Value Date     09/11/2020    K 3.8 09/11/2020     09/11/2020    CO2 20 09/11/2020    BUN 4 09/11/2020    CREATININE 0.43 09/11/2020    GFRAA >60.0 09/11/2020 LABGLOM >60.0 2020    GLUCOSE 98 2020    PROT 6.7 2020    CALCIUM 9.3 2020    BILITOT 0.5 2020    ALKPHOS 51 2020    AST 11 2020    ALT 20 2020       POC Tests: No results for input(s): POCGLU, POCNA, POCK, POCCL, POCBUN, POCHEMO, POCHCT in the last 72 hours. Coags:   Lab Results   Component Value Date    PROTIME 12.2 2020    INR 0.9 2020    APTT 25.3 2020       HCG (If Applicable):   Lab Results   Component Value Date    PREGTESTUR positive 2020        ABGs: No results found for: PHART, PO2ART, NKX3QFG, LAM2XCM, BEART, H4FPEBLI     Type & Screen (If Applicable):  No results found for: LABABO, LABRH    Drug/Infectious Status (If Applicable):  No results found for: HIV, HEPCAB    COVID-19 Screening (If Applicable):   Lab Results   Component Value Date    COVID19 Not Detected 2021           Anesthesia Evaluation  Patient summary reviewed and Nursing notes reviewed no history of anesthetic complications:   Airway: Mallampati: IV        Dental: normal exam         Pulmonary:normal exam    (+) shortness of breath: no interval change,                             Cardiovascular:Negative CV ROS  Exercise tolerance: no interval change,           Rhythm: regular  Rate: normal           Beta Blocker:  Not on Beta Blocker         Neuro/Psych:   (+) seizures: no interval change, psychiatric history:depression/anxiety             GI/Hepatic/Renal:   (+) morbid obesity          Endo/Other:                      ROS comment: Gestational Diabetes,  Abdominal:   (+) obese,         Vascular: negative vascular ROS. Anesthesia Plan      spinal     ASA 3             Anesthetic plan and risks discussed with patient. Use of blood products discussed with patient whom. Plan discussed with CRNA and attending.                   EDDIE Newman - CRNA   3/28/2021

## 2021-03-29 ENCOUNTER — HOSPITAL ENCOUNTER (INPATIENT)
Age: 26
LOS: 3 days | Discharge: HOME | DRG: 540 | End: 2021-04-01
Attending: OBSTETRICS & GYNECOLOGY | Admitting: OBSTETRICS & GYNECOLOGY
Payer: COMMERCIAL

## 2021-03-29 ENCOUNTER — ANESTHESIA (OUTPATIENT)
Dept: LABOR AND DELIVERY | Age: 26
DRG: 540 | End: 2021-03-29
Payer: COMMERCIAL

## 2021-03-29 VITALS — DIASTOLIC BLOOD PRESSURE: 71 MMHG | SYSTOLIC BLOOD PRESSURE: 149 MMHG | OXYGEN SATURATION: 100 %

## 2021-03-29 PROBLEM — Z78.9 ADMITTED TO LABOR AND DELIVERY: Status: ACTIVE | Noted: 2021-03-29

## 2021-03-29 LAB
ABO/RH: NORMAL
ALBUMIN SERPL-MCNC: 3.3 G/DL (ref 3.5–4.6)
ALP BLD-CCNC: 173 U/L (ref 40–130)
ALT SERPL-CCNC: 8 U/L (ref 0–33)
ANION GAP SERPL CALCULATED.3IONS-SCNC: 13 MEQ/L (ref 9–15)
ANTIBODY SCREEN: NORMAL
AST SERPL-CCNC: 14 U/L (ref 0–35)
BACTERIA: ABNORMAL /HPF
BASOPHILS ABSOLUTE: 0 K/UL (ref 0–0.2)
BASOPHILS RELATIVE PERCENT: 0.2 %
BILIRUB SERPL-MCNC: 0.5 MG/DL (ref 0.2–0.7)
BILIRUBIN URINE: NEGATIVE
BLOOD, URINE: NEGATIVE
BUN BLDV-MCNC: 7 MG/DL (ref 6–20)
CALCIUM SERPL-MCNC: 9.3 MG/DL (ref 8.5–9.9)
CHLORIDE BLD-SCNC: 107 MEQ/L (ref 95–107)
CLARITY: CLEAR
CO2: 18 MEQ/L (ref 20–31)
COLOR: YELLOW
CREAT SERPL-MCNC: 0.44 MG/DL (ref 0.5–0.9)
EOSINOPHILS ABSOLUTE: 0.1 K/UL (ref 0–0.7)
EOSINOPHILS RELATIVE PERCENT: 0.8 %
EPITHELIAL CELLS, UA: ABNORMAL /HPF (ref 0–5)
FETAL SCREEN: NORMAL
GFR AFRICAN AMERICAN: >60
GFR NON-AFRICAN AMERICAN: >60
GLOBULIN: 3.2 G/DL (ref 2.3–3.5)
GLUCOSE BLD-MCNC: 83 MG/DL (ref 70–99)
GLUCOSE URINE: NEGATIVE MG/DL
HCT VFR BLD CALC: 37 % (ref 37–47)
HEMOGLOBIN: 12.3 G/DL (ref 12–16)
HEPATITIS B SURFACE ANTIGEN INTERPRETATION: NORMAL
HYALINE CASTS: ABNORMAL /HPF (ref 0–5)
KETONES, URINE: NEGATIVE MG/DL
LEUKOCYTE ESTERASE, URINE: ABNORMAL
LYMPHOCYTES ABSOLUTE: 1.7 K/UL (ref 1–4.8)
LYMPHOCYTES RELATIVE PERCENT: 15.3 %
MCH RBC QN AUTO: 28.7 PG (ref 27–31.3)
MCHC RBC AUTO-ENTMCNC: 33.2 % (ref 33–37)
MCV RBC AUTO: 86.3 FL (ref 82–100)
MONOCYTES ABSOLUTE: 0.6 K/UL (ref 0.2–0.8)
MONOCYTES RELATIVE PERCENT: 5.2 %
NEUTROPHILS ABSOLUTE: 8.9 K/UL (ref 1.4–6.5)
NEUTROPHILS RELATIVE PERCENT: 78.5 %
NITRITE, URINE: NEGATIVE
PDW BLD-RTO: 13.7 % (ref 11.5–14.5)
PH UA: 5.5 (ref 5–9)
PLATELET # BLD: 202 K/UL (ref 130–400)
POTASSIUM SERPL-SCNC: 3.9 MEQ/L (ref 3.4–4.9)
PROTEIN UA: NEGATIVE MG/DL
RBC # BLD: 4.29 M/UL (ref 4.2–5.4)
RBC UA: ABNORMAL /HPF (ref 0–5)
RPR: NORMAL
RUBELLA ANTIBODY IGG: 41.7 IU/ML
SARS-COV-2, NAAT: NOT DETECTED
SODIUM BLD-SCNC: 138 MEQ/L (ref 135–144)
SPECIFIC GRAVITY UA: 1.02 (ref 1–1.03)
TOTAL PROTEIN: 6.5 G/DL (ref 6.3–8)
UROBILINOGEN, URINE: 0.2 E.U./DL
WBC # BLD: 11.3 K/UL (ref 4.8–10.8)
WBC UA: ABNORMAL /HPF (ref 0–5)

## 2021-03-29 PROCEDURE — 6370000000 HC RX 637 (ALT 250 FOR IP): Performed by: OBSTETRICS & GYNECOLOGY

## 2021-03-29 PROCEDURE — 7100000001 HC PACU RECOVERY - ADDTL 15 MIN: Performed by: OBSTETRICS & GYNECOLOGY

## 2021-03-29 PROCEDURE — 6360000002 HC RX W HCPCS: Performed by: OBSTETRICS & GYNECOLOGY

## 2021-03-29 PROCEDURE — 86762 RUBELLA ANTIBODY: CPT

## 2021-03-29 PROCEDURE — 2500000003 HC RX 250 WO HCPCS

## 2021-03-29 PROCEDURE — 3700000000 HC ANESTHESIA ATTENDED CARE: Performed by: OBSTETRICS & GYNECOLOGY

## 2021-03-29 PROCEDURE — 85461 HEMOGLOBIN FETAL: CPT

## 2021-03-29 PROCEDURE — 1220000000 HC SEMI PRIVATE OB R&B

## 2021-03-29 PROCEDURE — 87340 HEPATITIS B SURFACE AG IA: CPT

## 2021-03-29 PROCEDURE — 86592 SYPHILIS TEST NON-TREP QUAL: CPT

## 2021-03-29 PROCEDURE — 2500000003 HC RX 250 WO HCPCS: Performed by: NURSE ANESTHETIST, CERTIFIED REGISTERED

## 2021-03-29 PROCEDURE — 86900 BLOOD TYPING SEROLOGIC ABO: CPT

## 2021-03-29 PROCEDURE — 3700000001 HC ADD 15 MINUTES (ANESTHESIA): Performed by: OBSTETRICS & GYNECOLOGY

## 2021-03-29 PROCEDURE — 2500000003 HC RX 250 WO HCPCS: Performed by: OBSTETRICS & GYNECOLOGY

## 2021-03-29 PROCEDURE — 87635 SARS-COV-2 COVID-19 AMP PRB: CPT

## 2021-03-29 PROCEDURE — 2709999900 HC NON-CHARGEABLE SUPPLY: Performed by: OBSTETRICS & GYNECOLOGY

## 2021-03-29 PROCEDURE — 81001 URINALYSIS AUTO W/SCOPE: CPT

## 2021-03-29 PROCEDURE — 59514 CESAREAN DELIVERY ONLY: CPT | Performed by: OBSTETRICS & GYNECOLOGY

## 2021-03-29 PROCEDURE — 86901 BLOOD TYPING SEROLOGIC RH(D): CPT

## 2021-03-29 PROCEDURE — 6360000002 HC RX W HCPCS: Performed by: NURSE ANESTHETIST, CERTIFIED REGISTERED

## 2021-03-29 PROCEDURE — 85025 COMPLETE CBC W/AUTO DIFF WBC: CPT

## 2021-03-29 PROCEDURE — 2580000003 HC RX 258: Performed by: OBSTETRICS & GYNECOLOGY

## 2021-03-29 PROCEDURE — 80053 COMPREHEN METABOLIC PANEL: CPT

## 2021-03-29 PROCEDURE — 86850 RBC ANTIBODY SCREEN: CPT

## 2021-03-29 PROCEDURE — 7100000000 HC PACU RECOVERY - FIRST 15 MIN: Performed by: OBSTETRICS & GYNECOLOGY

## 2021-03-29 PROCEDURE — 36415 COLL VENOUS BLD VENIPUNCTURE: CPT

## 2021-03-29 PROCEDURE — 3609079900 HC CESAREAN SECTION: Performed by: OBSTETRICS & GYNECOLOGY

## 2021-03-29 RX ORDER — SODIUM CHLORIDE, SODIUM LACTATE, POTASSIUM CHLORIDE, AND CALCIUM CHLORIDE .6; .31; .03; .02 G/100ML; G/100ML; G/100ML; G/100ML
1000 INJECTION, SOLUTION INTRAVENOUS ONCE
Status: COMPLETED | OUTPATIENT
Start: 2021-03-29 | End: 2021-03-29

## 2021-03-29 RX ORDER — BISACODYL 10 MG
10 SUPPOSITORY, RECTAL RECTAL DAILY PRN
Status: DISCONTINUED | OUTPATIENT
Start: 2021-03-29 | End: 2021-04-01 | Stop reason: HOSPADM

## 2021-03-29 RX ORDER — DOCUSATE SODIUM 100 MG/1
100 CAPSULE, LIQUID FILLED ORAL 2 TIMES DAILY
Status: DISCONTINUED | OUTPATIENT
Start: 2021-03-29 | End: 2021-04-01 | Stop reason: HOSPADM

## 2021-03-29 RX ORDER — FERROUS SULFATE 325(65) MG
325 TABLET ORAL 2 TIMES DAILY WITH MEALS
Status: DISCONTINUED | OUTPATIENT
Start: 2021-03-29 | End: 2021-04-01 | Stop reason: HOSPADM

## 2021-03-29 RX ORDER — SODIUM CHLORIDE 0.9 % (FLUSH) 0.9 %
10 SYRINGE (ML) INJECTION PRN
Status: DISCONTINUED | OUTPATIENT
Start: 2021-03-29 | End: 2021-03-29

## 2021-03-29 RX ORDER — KETOROLAC TROMETHAMINE 30 MG/ML
30 INJECTION, SOLUTION INTRAMUSCULAR; INTRAVENOUS EVERY 6 HOURS
Status: DISPENSED | OUTPATIENT
Start: 2021-03-29 | End: 2021-03-31

## 2021-03-29 RX ORDER — CEPHALEXIN 500 MG/1
500 CAPSULE ORAL EVERY 8 HOURS SCHEDULED
Status: DISPENSED | OUTPATIENT
Start: 2021-03-29 | End: 2021-03-31

## 2021-03-29 RX ORDER — SCOLOPAMINE TRANSDERMAL SYSTEM 1 MG/1
1 PATCH, EXTENDED RELEASE TRANSDERMAL
Status: CANCELLED | OUTPATIENT
Start: 2021-03-29

## 2021-03-29 RX ORDER — SODIUM CHLORIDE, SODIUM LACTATE, POTASSIUM CHLORIDE, CALCIUM CHLORIDE 600; 310; 30; 20 MG/100ML; MG/100ML; MG/100ML; MG/100ML
INJECTION, SOLUTION INTRAVENOUS CONTINUOUS
Status: DISCONTINUED | OUTPATIENT
Start: 2021-03-29 | End: 2021-04-01 | Stop reason: HOSPADM

## 2021-03-29 RX ORDER — METHYLERGONOVINE MALEATE 0.2 MG/ML
200 INJECTION INTRAVENOUS PRN
Status: DISCONTINUED | OUTPATIENT
Start: 2021-03-29 | End: 2021-04-01 | Stop reason: HOSPADM

## 2021-03-29 RX ORDER — ESMOLOL HYDROCHLORIDE 10 MG/ML
INJECTION INTRAVENOUS PRN
Status: DISCONTINUED | OUTPATIENT
Start: 2021-03-29 | End: 2021-03-29 | Stop reason: SDUPTHER

## 2021-03-29 RX ORDER — MODIFIED LANOLIN
OINTMENT (GRAM) TOPICAL
Status: DISCONTINUED | OUTPATIENT
Start: 2021-03-29 | End: 2021-04-01 | Stop reason: HOSPADM

## 2021-03-29 RX ORDER — SODIUM CHLORIDE 0.9 % (FLUSH) 0.9 %
10 SYRINGE (ML) INJECTION EVERY 12 HOURS SCHEDULED
Status: DISCONTINUED | OUTPATIENT
Start: 2021-03-29 | End: 2021-03-29

## 2021-03-29 RX ORDER — OXYCODONE HYDROCHLORIDE AND ACETAMINOPHEN 5; 325 MG/1; MG/1
2 TABLET ORAL EVERY 4 HOURS PRN
Status: DISCONTINUED | OUTPATIENT
Start: 2021-03-29 | End: 2021-04-01 | Stop reason: HOSPADM

## 2021-03-29 RX ORDER — PRENATAL VIT/IRON FUM/FOLIC AC 27MG-0.8MG
1 TABLET ORAL DAILY
Status: DISCONTINUED | OUTPATIENT
Start: 2021-03-29 | End: 2021-04-01 | Stop reason: HOSPADM

## 2021-03-29 RX ORDER — FENTANYL CITRATE 50 UG/ML
INJECTION, SOLUTION INTRAMUSCULAR; INTRAVENOUS PRN
Status: DISCONTINUED | OUTPATIENT
Start: 2021-03-29 | End: 2021-03-29 | Stop reason: SDUPTHER

## 2021-03-29 RX ORDER — MORPHINE SULFATE 1 MG/ML
INJECTION, SOLUTION EPIDURAL; INTRATHECAL; INTRAVENOUS PRN
Status: DISCONTINUED | OUTPATIENT
Start: 2021-03-29 | End: 2021-03-29 | Stop reason: SDUPTHER

## 2021-03-29 RX ORDER — ONDANSETRON 2 MG/ML
INJECTION INTRAMUSCULAR; INTRAVENOUS PRN
Status: DISCONTINUED | OUTPATIENT
Start: 2021-03-29 | End: 2021-03-29 | Stop reason: SDUPTHER

## 2021-03-29 RX ORDER — METRONIDAZOLE 500 MG/1
500 TABLET ORAL EVERY 8 HOURS SCHEDULED
Status: DISPENSED | OUTPATIENT
Start: 2021-03-29 | End: 2021-03-31

## 2021-03-29 RX ORDER — IBUPROFEN 800 MG/1
800 TABLET ORAL EVERY 8 HOURS
Status: DISCONTINUED | OUTPATIENT
Start: 2021-03-29 | End: 2021-04-01 | Stop reason: HOSPADM

## 2021-03-29 RX ORDER — ONDANSETRON 2 MG/ML
4 INJECTION INTRAMUSCULAR; INTRAVENOUS EVERY 6 HOURS PRN
Status: DISCONTINUED | OUTPATIENT
Start: 2021-03-29 | End: 2021-03-29

## 2021-03-29 RX ORDER — OXYCODONE HYDROCHLORIDE AND ACETAMINOPHEN 5; 325 MG/1; MG/1
1 TABLET ORAL EVERY 4 HOURS PRN
Status: DISCONTINUED | OUTPATIENT
Start: 2021-03-29 | End: 2021-04-01 | Stop reason: HOSPADM

## 2021-03-29 RX ORDER — CEFAZOLIN SODIUM 2 G/50ML
2000 SOLUTION INTRAVENOUS ONCE
Status: COMPLETED | OUTPATIENT
Start: 2021-03-29 | End: 2021-03-29

## 2021-03-29 RX ORDER — SIMETHICONE 80 MG
80 TABLET,CHEWABLE ORAL EVERY 6 HOURS PRN
Status: DISCONTINUED | OUTPATIENT
Start: 2021-03-29 | End: 2021-04-01 | Stop reason: HOSPADM

## 2021-03-29 RX ORDER — SODIUM CHLORIDE, SODIUM LACTATE, POTASSIUM CHLORIDE, CALCIUM CHLORIDE 600; 310; 30; 20 MG/100ML; MG/100ML; MG/100ML; MG/100ML
INJECTION, SOLUTION INTRAVENOUS CONTINUOUS
Status: DISCONTINUED | OUTPATIENT
Start: 2021-03-29 | End: 2021-03-29

## 2021-03-29 RX ORDER — ONDANSETRON 2 MG/ML
4 INJECTION INTRAMUSCULAR; INTRAVENOUS EVERY 6 HOURS PRN
Status: DISCONTINUED | OUTPATIENT
Start: 2021-03-29 | End: 2021-04-01 | Stop reason: HOSPADM

## 2021-03-29 RX ORDER — EPHEDRINE SULFATE/0.9% NACL/PF 50 MG/5 ML
SYRINGE (ML) INTRAVENOUS PRN
Status: DISCONTINUED | OUTPATIENT
Start: 2021-03-29 | End: 2021-03-29 | Stop reason: SDUPTHER

## 2021-03-29 RX ORDER — ACETAMINOPHEN 325 MG/1
650 TABLET ORAL EVERY 4 HOURS PRN
Status: DISCONTINUED | OUTPATIENT
Start: 2021-03-29 | End: 2021-04-01 | Stop reason: HOSPADM

## 2021-03-29 RX ORDER — DIPHENHYDRAMINE HYDROCHLORIDE 50 MG/ML
25 INJECTION INTRAMUSCULAR; INTRAVENOUS EVERY 6 HOURS PRN
Status: DISCONTINUED | OUTPATIENT
Start: 2021-03-29 | End: 2021-04-01 | Stop reason: HOSPADM

## 2021-03-29 RX ORDER — DEXAMETHASONE SODIUM PHOSPHATE 10 MG/ML
INJECTION, SOLUTION INTRAMUSCULAR; INTRAVENOUS PRN
Status: DISCONTINUED | OUTPATIENT
Start: 2021-03-29 | End: 2021-03-29 | Stop reason: SDUPTHER

## 2021-03-29 RX ORDER — NALOXONE HYDROCHLORIDE 0.4 MG/ML
0.4 INJECTION, SOLUTION INTRAMUSCULAR; INTRAVENOUS; SUBCUTANEOUS PRN
Status: CANCELLED | OUTPATIENT
Start: 2021-03-29

## 2021-03-29 RX ORDER — SODIUM CHLORIDE 9 MG/ML
25 INJECTION, SOLUTION INTRAVENOUS PRN
Status: DISCONTINUED | OUTPATIENT
Start: 2021-03-29 | End: 2021-03-29

## 2021-03-29 RX ORDER — SODIUM CHLORIDE 0.9 % (FLUSH) 0.9 %
10 SYRINGE (ML) INJECTION PRN
Status: DISCONTINUED | OUTPATIENT
Start: 2021-03-29 | End: 2021-04-01 | Stop reason: HOSPADM

## 2021-03-29 RX ORDER — SODIUM CHLORIDE, SODIUM LACTATE, POTASSIUM CHLORIDE, CALCIUM CHLORIDE 600; 310; 30; 20 MG/100ML; MG/100ML; MG/100ML; MG/100ML
INJECTION, SOLUTION INTRAVENOUS CONTINUOUS PRN
Status: DISCONTINUED | OUTPATIENT
Start: 2021-03-29 | End: 2021-03-29

## 2021-03-29 RX ORDER — KETOROLAC TROMETHAMINE 30 MG/ML
INJECTION, SOLUTION INTRAMUSCULAR; INTRAVENOUS PRN
Status: DISCONTINUED | OUTPATIENT
Start: 2021-03-29 | End: 2021-03-29 | Stop reason: SDUPTHER

## 2021-03-29 RX ORDER — SODIUM CHLORIDE 0.9 % (FLUSH) 0.9 %
10 SYRINGE (ML) INJECTION EVERY 12 HOURS SCHEDULED
Status: DISCONTINUED | OUTPATIENT
Start: 2021-03-29 | End: 2021-04-01 | Stop reason: HOSPADM

## 2021-03-29 RX ORDER — SCOLOPAMINE TRANSDERMAL SYSTEM 1 MG/1
1 PATCH, EXTENDED RELEASE TRANSDERMAL
Status: DISCONTINUED | OUTPATIENT
Start: 2021-03-29 | End: 2021-03-29

## 2021-03-29 RX ADMIN — SODIUM CHLORIDE, POTASSIUM CHLORIDE, SODIUM LACTATE AND CALCIUM CHLORIDE: 600; 310; 30; 20 INJECTION, SOLUTION INTRAVENOUS at 08:54

## 2021-03-29 RX ADMIN — Medication 40 MG: at 08:22

## 2021-03-29 RX ADMIN — METRONIDAZOLE 500 MG: 500 TABLET, FILM COATED ORAL at 21:33

## 2021-03-29 RX ADMIN — SODIUM CHLORIDE, POTASSIUM CHLORIDE, SODIUM LACTATE AND CALCIUM CHLORIDE: 600; 310; 30; 20 INJECTION, SOLUTION INTRAVENOUS at 07:15

## 2021-03-29 RX ADMIN — SODIUM CHLORIDE, POTASSIUM CHLORIDE, SODIUM LACTATE AND CALCIUM CHLORIDE 1000 ML: 600; 310; 30; 20 INJECTION, SOLUTION INTRAVENOUS at 06:45

## 2021-03-29 RX ADMIN — ONDANSETRON 4 MG: 2 INJECTION INTRAMUSCULAR; INTRAVENOUS at 08:20

## 2021-03-29 RX ADMIN — ESMOLOL HYDROCHLORIDE 5 MG: 100 INJECTION, SOLUTION INTRAVENOUS at 08:25

## 2021-03-29 RX ADMIN — FAMOTIDINE 20 MG: 10 INJECTION INTRAVENOUS at 21:33

## 2021-03-29 RX ADMIN — CEPHALEXIN 500 MG: 500 CAPSULE ORAL at 14:05

## 2021-03-29 RX ADMIN — FENTANYL CITRATE 15 MCG: 50 INJECTION, SOLUTION INTRAMUSCULAR; INTRAVENOUS at 08:16

## 2021-03-29 RX ADMIN — CEFAZOLIN SODIUM 2 G: 2 SOLUTION INTRAVENOUS at 08:19

## 2021-03-29 RX ADMIN — MORPHINE SULFATE 0.2 MG: 1 INJECTION EPIDURAL; INTRATHECAL; INTRAVENOUS at 08:16

## 2021-03-29 RX ADMIN — KETOROLAC TROMETHAMINE 30 MG: 30 INJECTION, SOLUTION INTRAMUSCULAR; INTRAVENOUS at 08:55

## 2021-03-29 RX ADMIN — METRONIDAZOLE 500 MG: 500 TABLET, FILM COATED ORAL at 14:05

## 2021-03-29 RX ADMIN — FAMOTIDINE 20 MG: 10 INJECTION, SOLUTION INTRAVENOUS at 06:44

## 2021-03-29 RX ADMIN — DEXAMETHASONE SODIUM PHOSPHATE 10 MG: 10 INJECTION, SOLUTION INTRAMUSCULAR; INTRAVENOUS at 08:19

## 2021-03-29 RX ADMIN — DOCUSATE SODIUM 100 MG: 100 CAPSULE, LIQUID FILLED ORAL at 21:33

## 2021-03-29 RX ADMIN — CEPHALEXIN 500 MG: 500 CAPSULE ORAL at 21:33

## 2021-03-29 RX ADMIN — SODIUM CHLORIDE, POTASSIUM CHLORIDE, SODIUM LACTATE AND CALCIUM CHLORIDE: 600; 310; 30; 20 INJECTION, SOLUTION INTRAVENOUS at 23:52

## 2021-03-29 RX ADMIN — ESMOLOL HYDROCHLORIDE 5 MG: 100 INJECTION, SOLUTION INTRAVENOUS at 08:30

## 2021-03-29 RX ADMIN — KETOROLAC TROMETHAMINE 30 MG: 30 INJECTION, SOLUTION INTRAMUSCULAR at 21:33

## 2021-03-29 RX ADMIN — Medication 170 ML: at 08:35

## 2021-03-29 RX ADMIN — SODIUM CHLORIDE, POTASSIUM CHLORIDE, SODIUM LACTATE AND CALCIUM CHLORIDE: 600; 310; 30; 20 INJECTION, SOLUTION INTRAVENOUS at 17:54

## 2021-03-29 RX ADMIN — ESMOLOL HYDROCHLORIDE 5 MG: 100 INJECTION, SOLUTION INTRAVENOUS at 08:24

## 2021-03-29 RX ADMIN — KETOROLAC TROMETHAMINE 30 MG: 30 INJECTION, SOLUTION INTRAMUSCULAR at 14:46

## 2021-03-29 ASSESSMENT — PULMONARY FUNCTION TESTS
PIF_VALUE: 1
PIF_VALUE: 0
PIF_VALUE: 1
PIF_VALUE: 0
PIF_VALUE: 1
PIF_VALUE: 0
PIF_VALUE: 1
PIF_VALUE: 0
PIF_VALUE: 1
PIF_VALUE: 0
PIF_VALUE: 0
PIF_VALUE: 1
PIF_VALUE: 0
PIF_VALUE: 1

## 2021-03-29 ASSESSMENT — PAIN SCALES - GENERAL: PAINLEVEL_OUTOF10: 4

## 2021-03-29 NOTE — PLAN OF CARE
Problem: Anxiety:  Goal: Level of anxiety will decrease  Description: Level of anxiety will decrease  3/29/2021 0943 by Katia Smith RN  Outcome: Completed  3/29/2021 0708 by Dexter Holloway RN  Outcome: Ongoing     Problem: Aspiration - Risk of:  Goal: Absence of aspiration  Description: Absence of aspiration  3/29/2021 0943 by Katia Smith RN  Outcome: Completed  3/29/2021 0708 by Dexter Holloway RN  Outcome: Ongoing     Problem: Tissue Perfusion - Uteroplacental, Altered:  Goal: Absence of abnormal fetal heart rate pattern  Description: Absence of abnormal fetal heart rate pattern  3/29/2021 0943 by Katia Smith RN  Outcome: Completed  3/29/2021 0708 by Dexter Holloway RN  Outcome: Ongoing     Problem: Venous Thromboembolism - Risk of:  Goal: Will show no signs or symptoms of venous thromboembolism  Description: Will show no signs or symptoms of venous thromboembolism  3/29/2021 0943 by Katia Smith RN  Outcome: Completed  3/29/2021 0708 by Dexter Holloway RN  Outcome: Ongoing

## 2021-03-29 NOTE — OP NOTE
Pt Name: Tiffanie Orozco  MRN: [id number] [place] #: [de-identified]  YOB: 1995  Procedure Performed By: Immanuel Kamara MD     Indications: primary LTCS , CPD, LGA baby    Pre-operative Diagnosis: 39 week pregnancy. Post-operative Diagnosis:  Same, Delivered, Living newbornMale  Procedure:  primary low transverse  section  Findings:  Normal tubes, ovaries and uterus. Baby Male, Apgars  9 at 1 and 9 at 5  and weight of 8 lbs and 8 ounces. Estimated Blood Loss:  500ml         Specimens: None     Complications:  None         Condition: infant stable to general nursery and mother stable  Assistant : Dr Deanna Wade MD assisted in the proper positioning, prepping, and draping of the patient, intraoperative retraction and suctioning for visualization, passing sutures and suture management. Indications:     Tiffanie Orozco is a 27 y.o. female  at 44w2d who presented for   section : as above. She understood the  risks and benefits and signed informed consent. Procedure: The patient was taken to the Operating Room where spinal anesthesia was placed. She was placed in the dorsal supine position with a leftward tilt and prepped and draped. A funez catheter was inserted into the bladder in a sterile manner and was draining clear urine. A transverse suprapubic incision was made and the abdomen was opened with a Pfannenstiel incision. The peritoneum identified and entered bluntly. This incision extended superiorly and inferior with good visualization of the bladder. The vesicouterine peritoneum was identified and incised. This incision extended laterally and the bladder flap created digitally. The myometrium was scored and then entered bluntly. The uterus was incised in a low transverse fashion and extended digitally. The vertex was removed from the uterus manually and with fundal pressure. The nose and mouth were suctioned.   The rest of the baby delivered. The cord was clamped and cut and the baby was stimulated. The baby was handed to the nursery nurse. Cord blood was obtained, the placenta delivered intact. Membranes and cotyledons were intact. The uterus was cleared of all clots and debris and repaired with #0 Vicryl in a running locked fashion. A second imbricating layer of #0 Vicryl was used. Then irrigation took place, hemostasis was assured with Bovie cautery. The peritoneum was closed with #2-0 Vicryl, the fascia was closed with #0 Vicryl from both angles tied in the middle. The subcutaneous tissue was irrigated, made hemostatic with Bovie cautery and approximated with #0 chromic . The skin was closed with 4 0 vicryl suture (s). Sponge, lap and needle counts were correct x 2. The patient tolerated the procedure well.       Dariela Aguillon MD.  3/29/2021 9:13 AM PROBLEM DIAGNOSES  Problem: Lesion of left ulnar nerve  Assessment and Plan:     Problem: Anxiety with depression  Assessment and Plan: Instructed to continue meds &  take with sips of water in AM the day of surgery

## 2021-03-30 LAB
ALBUMIN SERPL-MCNC: 2.8 G/DL (ref 3.5–4.6)
ALP BLD-CCNC: 132 U/L (ref 40–130)
ALT SERPL-CCNC: 7 U/L (ref 0–33)
ANION GAP SERPL CALCULATED.3IONS-SCNC: 8 MEQ/L (ref 9–15)
AST SERPL-CCNC: 18 U/L (ref 0–35)
BASOPHILS ABSOLUTE: 0 K/UL (ref 0–0.2)
BASOPHILS RELATIVE PERCENT: 0.3 %
BILIRUB SERPL-MCNC: 0.4 MG/DL (ref 0.2–0.7)
BUN BLDV-MCNC: 7 MG/DL (ref 6–20)
CALCIUM SERPL-MCNC: 9 MG/DL (ref 8.5–9.9)
CHLORIDE BLD-SCNC: 107 MEQ/L (ref 95–107)
CO2: 22 MEQ/L (ref 20–31)
CREAT SERPL-MCNC: 0.54 MG/DL (ref 0.5–0.9)
EOSINOPHILS ABSOLUTE: 0 K/UL (ref 0–0.7)
EOSINOPHILS RELATIVE PERCENT: 0.1 %
GFR AFRICAN AMERICAN: >60
GFR NON-AFRICAN AMERICAN: >60
GLOBULIN: 2.6 G/DL (ref 2.3–3.5)
GLUCOSE BLD-MCNC: 85 MG/DL (ref 70–99)
HCT VFR BLD CALC: 30.5 % (ref 37–47)
HEMOGLOBIN: 10 G/DL (ref 12–16)
LYMPHOCYTES ABSOLUTE: 1.8 K/UL (ref 1–4.8)
LYMPHOCYTES RELATIVE PERCENT: 15.5 %
MCH RBC QN AUTO: 28.5 PG (ref 27–31.3)
MCHC RBC AUTO-ENTMCNC: 32.8 % (ref 33–37)
MCV RBC AUTO: 86.8 FL (ref 82–100)
MONOCYTES ABSOLUTE: 0.7 K/UL (ref 0.2–0.8)
MONOCYTES RELATIVE PERCENT: 6.1 %
NEUTROPHILS ABSOLUTE: 8.9 K/UL (ref 1.4–6.5)
NEUTROPHILS RELATIVE PERCENT: 78 %
PDW BLD-RTO: 14 % (ref 11.5–14.5)
PLATELET # BLD: 175 K/UL (ref 130–400)
POTASSIUM SERPL-SCNC: 4.1 MEQ/L (ref 3.4–4.9)
RBC # BLD: 3.52 M/UL (ref 4.2–5.4)
SODIUM BLD-SCNC: 137 MEQ/L (ref 135–144)
TOTAL PROTEIN: 5.4 G/DL (ref 6.3–8)
WBC # BLD: 11.4 K/UL (ref 4.8–10.8)

## 2021-03-30 PROCEDURE — 6370000000 HC RX 637 (ALT 250 FOR IP): Performed by: OBSTETRICS & GYNECOLOGY

## 2021-03-30 PROCEDURE — 85025 COMPLETE CBC W/AUTO DIFF WBC: CPT

## 2021-03-30 PROCEDURE — 1220000000 HC SEMI PRIVATE OB R&B

## 2021-03-30 PROCEDURE — 80053 COMPREHEN METABOLIC PANEL: CPT

## 2021-03-30 PROCEDURE — 36415 COLL VENOUS BLD VENIPUNCTURE: CPT

## 2021-03-30 PROCEDURE — 96372 THER/PROPH/DIAG INJ SC/IM: CPT

## 2021-03-30 PROCEDURE — 6360000002 HC RX W HCPCS: Performed by: OBSTETRICS & GYNECOLOGY

## 2021-03-30 PROCEDURE — 99024 POSTOP FOLLOW-UP VISIT: CPT | Performed by: OBSTETRICS & GYNECOLOGY

## 2021-03-30 RX ADMIN — HUMAN RHO(D) IMMUNE GLOBULIN 300 MCG: 1500 SOLUTION INTRAMUSCULAR; INTRAVENOUS at 08:40

## 2021-03-30 RX ADMIN — FERROUS SULFATE TAB 325 MG (65 MG ELEMENTAL FE) 325 MG: 325 (65 FE) TAB at 08:40

## 2021-03-30 RX ADMIN — KETOROLAC TROMETHAMINE 30 MG: 30 INJECTION, SOLUTION INTRAMUSCULAR at 03:36

## 2021-03-30 RX ADMIN — FERROUS SULFATE TAB 325 MG (65 MG ELEMENTAL FE) 325 MG: 325 (65 FE) TAB at 17:35

## 2021-03-30 RX ADMIN — DOCUSATE SODIUM 100 MG: 100 CAPSULE, LIQUID FILLED ORAL at 08:40

## 2021-03-30 RX ADMIN — OXYCODONE HYDROCHLORIDE AND ACETAMINOPHEN 1 TABLET: 5; 325 TABLET ORAL at 05:42

## 2021-03-30 RX ADMIN — CEPHALEXIN 500 MG: 500 CAPSULE ORAL at 05:42

## 2021-03-30 RX ADMIN — IBUPROFEN 800 MG: 800 TABLET ORAL at 17:35

## 2021-03-30 RX ADMIN — IBUPROFEN 800 MG: 800 TABLET ORAL at 09:32

## 2021-03-30 RX ADMIN — OXYCODONE HYDROCHLORIDE AND ACETAMINOPHEN 2 TABLET: 5; 325 TABLET ORAL at 14:17

## 2021-03-30 RX ADMIN — CEPHALEXIN 500 MG: 500 CAPSULE ORAL at 14:17

## 2021-03-30 RX ADMIN — METRONIDAZOLE 500 MG: 500 TABLET, FILM COATED ORAL at 14:17

## 2021-03-30 RX ADMIN — PRENATAL VIT W/ FE FUMARATE-FA TAB 27-0.8 MG 1 TABLET: 27-0.8 TAB at 08:39

## 2021-03-30 RX ADMIN — METRONIDAZOLE 500 MG: 500 TABLET, FILM COATED ORAL at 05:42

## 2021-03-30 ASSESSMENT — PAIN SCALES - GENERAL
PAINLEVEL_OUTOF10: 8
PAINLEVEL_OUTOF10: 8
PAINLEVEL_OUTOF10: 7

## 2021-03-30 NOTE — FLOWSHEET NOTE
Patient awakened from sleep. States that the medication did not help her pain, and is requesting pain medication. Nurse informs her that motrin is due in about an hour and patient states she will take the medication then. Nurse offers warm blanket and patient would like the warm blanket.

## 2021-03-30 NOTE — PROGRESS NOTES
PROGRESS NOTE:  POSTOP DAY 1    Pt doing well. Pt ambulating, tolerating po and voiding without issue. Pt bottle feeding without issue. BP (!) 148/70   Pulse 88   Temp 98 °F (36.7 °C) (Oral)   Resp 18   Ht 5' (1.524 m)   Wt 216 lb 11.2 oz (98.3 kg)   LMP 2020   SpO2 100%   Breastfeeding Unknown   BMI 42.32 kg/m²   Hemoglobin   Date Value Ref Range Status   2021 10.0 (L) 12.0 - 16.0 g/dL Final     CVS: RRR  Lungs: CTAB  ABD: soft, NT, uterus firm at umbilicus  Pfannenstiel incision without erythema  EXT: no c/c/e    27 y.o. Clarance Pollen now P1 s/p  delivery doing well POD#1. 1. Routine postop care   2. May discharge to home when infant released  3. Rx percocet and Ibuprofen  4.  F/u with Dr. Zachariah Lucas in 2wks    Rosey Jimenez MD

## 2021-03-30 NOTE — FLOWSHEET NOTE
Morning assessment complete. Patient denies concerns or questions at this time. Call light within reach of the patient.

## 2021-03-30 NOTE — PLAN OF CARE
Problem: Discharge Planning:  Goal: Discharged to appropriate level of care  Description: Discharged to appropriate level of care  Outcome: Ongoing     Problem: Infection - Surgical Site:  Goal: Will show no infection signs and symptoms  Description: Will show no infection signs and symptoms  Outcome: Ongoing     Problem: Pain - Acute:  Goal: Pain level will decrease  Description: Pain level will decrease  Outcome: Ongoing

## 2021-03-30 NOTE — FLOWSHEET NOTE
Nurse at the bedside infant asleep in the bed on a boppy pillow with a fuzzy blanket. Nurse educates the patient that if she does start to feel sleepy the baby needs to be placed in the crib without the blanket and boppy, Patient verbalized understanding.

## 2021-03-30 NOTE — FLOWSHEET NOTE
Patient funez catheter removed at this time. Patient up to bath room and raj care performed. Patient unable to void at this time. Patient encouraged to call for assistance when urge to void. Patient verbalizes understanding.

## 2021-03-30 NOTE — PLAN OF CARE
Problem: Discharge Planning:  Goal: Discharged to appropriate level of care  Description: Discharged to appropriate level of care  3/30/2021 0725 by Yolonda Fabry, RN  Outcome: Ongoing  3/30/2021 0027 by Zoila Arroyo RN  Outcome: Ongoing     Problem: Infection - Surgical Site:  Goal: Will show no infection signs and symptoms  Description: Will show no infection signs and symptoms  3/30/2021 0725 by Yolonda Fabry, RN  Outcome: Ongoing  3/30/2021 0027 by Zoila Arroyo RN  Outcome: Ongoing     Problem: Pain - Acute:  Goal: Pain level will decrease  Description: Pain level will decrease  3/30/2021 0725 by Yolonda Fabry, RN  Outcome: Ongoing  3/30/2021 0027 by Zoila Arroyo RN  Outcome: Ongoing

## 2021-03-31 PROCEDURE — 1220000000 HC SEMI PRIVATE OB R&B

## 2021-03-31 PROCEDURE — 6370000000 HC RX 637 (ALT 250 FOR IP): Performed by: OBSTETRICS & GYNECOLOGY

## 2021-03-31 RX ADMIN — OXYCODONE HYDROCHLORIDE AND ACETAMINOPHEN 2 TABLET: 5; 325 TABLET ORAL at 14:17

## 2021-03-31 RX ADMIN — IBUPROFEN 800 MG: 800 TABLET ORAL at 01:27

## 2021-03-31 RX ADMIN — CEPHALEXIN 500 MG: 500 CAPSULE ORAL at 07:54

## 2021-03-31 RX ADMIN — FERROUS SULFATE TAB 325 MG (65 MG ELEMENTAL FE) 325 MG: 325 (65 FE) TAB at 07:54

## 2021-03-31 RX ADMIN — PRENATAL VIT W/ FE FUMARATE-FA TAB 27-0.8 MG 1 TABLET: 27-0.8 TAB at 07:54

## 2021-03-31 RX ADMIN — DOCUSATE SODIUM 100 MG: 100 CAPSULE, LIQUID FILLED ORAL at 07:54

## 2021-03-31 RX ADMIN — OXYCODONE HYDROCHLORIDE AND ACETAMINOPHEN 2 TABLET: 5; 325 TABLET ORAL at 06:08

## 2021-03-31 RX ADMIN — METRONIDAZOLE 500 MG: 500 TABLET, FILM COATED ORAL at 07:54

## 2021-03-31 RX ADMIN — DOCUSATE SODIUM 100 MG: 100 CAPSULE, LIQUID FILLED ORAL at 22:55

## 2021-03-31 RX ADMIN — IBUPROFEN 800 MG: 800 TABLET ORAL at 09:48

## 2021-03-31 RX ADMIN — OXYCODONE HYDROCHLORIDE AND ACETAMINOPHEN 2 TABLET: 5; 325 TABLET ORAL at 22:54

## 2021-03-31 RX ADMIN — FERROUS SULFATE TAB 325 MG (65 MG ELEMENTAL FE) 325 MG: 325 (65 FE) TAB at 18:01

## 2021-03-31 RX ADMIN — IBUPROFEN 800 MG: 800 TABLET ORAL at 18:00

## 2021-03-31 ASSESSMENT — PAIN SCALES - GENERAL
PAINLEVEL_OUTOF10: 5
PAINLEVEL_OUTOF10: 6
PAINLEVEL_OUTOF10: 8

## 2021-03-31 NOTE — PROGRESS NOTES
Progress note    Subjective:     Postpartum Day 2:  Delivery    Pain is well controlled with current medications. The patient is ambulating well. The patient is tolerating a normal diet. Flatus has been passed. Objective:     Vitals:    21 0738   BP: (!) 145/62   Pulse: 72   Resp: 18   Temp: 97.9 °F (36.6 °C)   SpO2:          General:    alert, appears stated age and cooperative   Uterine Fundus:   firm   Incision:  healing well, no significant drainage, no dehiscence, no significant erythema        CBC   Lab Results   Component Value Date    WBC 11.4 (H) 2021    HGB 10.0 (L) 2021    HCT 30.5 (L) 2021     2021        Assessment:     Status post  section. Doing well postoperatively.       Plan:     Discharge home with standard precautions and return to clinic in 1 weeks    Neymar Almodovar MD 3/31/2021 1:11 PM

## 2021-03-31 NOTE — CARE COORDINATION
OTTONIELW met with pt and her mother with whom she lives. Pt's mother was asleep so she did not get to participate. This is the first baby for pt and she has a hx of anxiety. Pt reports that she has all needs for the baby at home. Resources were reviewed for help as needed. Pt has [employer]. OTTONIELW discussed Resource Mother for additional support at home. Pt reports that she has a  from the insurance who help her and checks on her often. LSW gave the pt resources for Help for Mom to assist with counseling and support for the anxiety surrounding being a new mother. Plan is for pt and baby to DC home tomorrow.

## 2021-04-01 VITALS
RESPIRATION RATE: 18 BRPM | SYSTOLIC BLOOD PRESSURE: 140 MMHG | DIASTOLIC BLOOD PRESSURE: 62 MMHG | TEMPERATURE: 98.2 F | HEART RATE: 77 BPM | BODY MASS INDEX: 42.54 KG/M2 | OXYGEN SATURATION: 100 % | WEIGHT: 216.7 LBS | HEIGHT: 60 IN

## 2021-04-01 PROCEDURE — 6370000000 HC RX 637 (ALT 250 FOR IP): Performed by: OBSTETRICS & GYNECOLOGY

## 2021-04-01 PROCEDURE — 99024 POSTOP FOLLOW-UP VISIT: CPT | Performed by: OBSTETRICS & GYNECOLOGY

## 2021-04-01 RX ORDER — DOCUSATE SODIUM 100 MG/1
100 CAPSULE, LIQUID FILLED ORAL 2 TIMES DAILY PRN
Qty: 60 CAPSULE | Refills: 2 | Status: SHIPPED | OUTPATIENT
Start: 2021-04-01 | End: 2022-01-25

## 2021-04-01 RX ORDER — IBUPROFEN 800 MG/1
800 TABLET ORAL EVERY 6 HOURS PRN
Qty: 120 TABLET | Refills: 3 | Status: SHIPPED | OUTPATIENT
Start: 2021-04-01 | End: 2022-01-25

## 2021-04-01 RX ORDER — ACETAMINOPHEN 500 MG
1000 TABLET ORAL EVERY 6 HOURS PRN
Qty: 60 TABLET | Refills: 1 | Status: SHIPPED | OUTPATIENT
Start: 2021-04-01

## 2021-04-01 RX ADMIN — DOCUSATE SODIUM 100 MG: 100 CAPSULE, LIQUID FILLED ORAL at 09:20

## 2021-04-01 RX ADMIN — IBUPROFEN 800 MG: 800 TABLET ORAL at 10:11

## 2021-04-01 RX ADMIN — IBUPROFEN 800 MG: 800 TABLET ORAL at 02:50

## 2021-04-01 RX ADMIN — OXYCODONE HYDROCHLORIDE AND ACETAMINOPHEN 2 TABLET: 5; 325 TABLET ORAL at 12:36

## 2021-04-01 RX ADMIN — PRENATAL VIT W/ FE FUMARATE-FA TAB 27-0.8 MG 1 TABLET: 27-0.8 TAB at 09:20

## 2021-04-01 RX ADMIN — IBUPROFEN 800 MG: 800 TABLET ORAL at 17:45

## 2021-04-01 RX ADMIN — OXYCODONE HYDROCHLORIDE AND ACETAMINOPHEN 1 TABLET: 5; 325 TABLET ORAL at 08:08

## 2021-04-01 ASSESSMENT — PAIN SCALES - GENERAL
PAINLEVEL_OUTOF10: 5
PAINLEVEL_OUTOF10: 3
PAINLEVEL_OUTOF10: 4
PAINLEVEL_OUTOF10: 3
PAINLEVEL_OUTOF10: 4

## 2021-04-01 NOTE — PLAN OF CARE
Problem: Discharge Planning:  Goal: Discharged to appropriate level of care  4/1/2021 0839 by Nereida Lu RN  Outcome: Ongoing  3/31/2021 2003 by Luana Jose RN  Outcome: Ongoing     Problem: Infection - Surgical Site:  Goal: Will show no infection signs and symptoms  4/1/2021 0839 by Nereida Lu RN  Outcome: Ongoing  3/31/2021 2003 by Luana Jose RN  Outcome: Ongoing     Problem: Pain - Acute:  Goal: Pain level will decrease  4/1/2021 0839 by Nereida Lu RN  Outcome: Ongoing  3/31/2021 2003 by Luana Jose RN  Outcome: Ongoing     Problem: Pain:  Description: Pain management should include both nonpharmacologic and pharmacologic interventions.   Goal: Pain level will decrease  4/1/2021 0839 by Nereida Lu RN  Outcome: Ongoing  3/31/2021 2003 by Luana Jose RN  Outcome: Ongoing  Goal: Control of acute pain  4/1/2021 0839 by Nereida Lu RN  Outcome: Ongoing  3/31/2021 2003 by Luana Jose RN  Outcome: Ongoing  Goal: Control of chronic pain  4/1/2021 0839 by Nereida Lu RN  Outcome: Ongoing  3/31/2021 2003 by Luana Jose RN  Outcome: Ongoing

## 2021-04-01 NOTE — DISCHARGE SUMMARY
Discharge summary     Subjective:     Postpartum Day 3:  Delivery    Pain is well controlled with current medications. The patient is ambulating well. The patient is tolerating a normal diet. Flatus has been passed. Objective:     Vitals:    21 1129   BP: (!) 140/62   Pulse: 77   Resp: 18   Temp: 98.2 °F (36.8 °C)   SpO2:          General:    alert, appears stated age and cooperative   Uterine Fundus:   firm   Incision:  healing well, no significant drainage, no dehiscence, no significant erythema        CBC   Lab Results   Component Value Date    WBC 11.4 (H) 2021    HGB 10.0 (L) 2021    HCT 30.5 (L) 2021     2021        Assessment:     Status post  section. Doing well postoperatively. Plan:     Discharge home with standard precautions and return to clinic in 1 weeks       Medication List      START taking these medications    acetaminophen 500 MG tablet  Commonly known as: APAP Extra Strength  Take 2 tablets by mouth every 6 hours as needed for Pain     docusate sodium 100 MG capsule  Commonly known as: Colace  Take 1 capsule by mouth 2 times daily as needed for Constipation     ibuprofen 800 MG tablet  Commonly known as: ADVIL;MOTRIN  Take 1 tablet by mouth every 6 hours as needed for Pain        ASK your doctor about these medications    blood glucose monitor kit and supplies  Please dispense whatever is covered by patient's insurance. blood glucose test strips  Test 4 times a day & as needed for symptoms of irregular blood glucose. Please dispense whatever is covered by patient's insurance     cyclobenzaprine 10 MG tablet  Commonly known as: FLEXERIL     Lancets Thin Misc  Test 4 times daily and as needed for symptoms of irregular blood glucose. Please dispense whatever is covered by patient's insurance.            Where to Get Your Medications      These medications were sent to [address] [place] [address] [address] - [address] [place] [phone] Juljenny Andrew [phone]  [address] [address]    Phone: [phone]   · acetaminophen 500 MG tablet  · docusate sodium 100 MG capsule  · ibuprofen 800 MG tablet         Sarah Mohr MD 4/1/2021 3:26 PM

## 2021-04-01 NOTE — PLAN OF CARE
Problem: Discharge Planning:  Goal: Discharged to appropriate level of care  Description: Discharged to appropriate level of care  Outcome: Ongoing     Problem: Infection - Surgical Site:  Goal: Will show no infection signs and symptoms  Description: Will show no infection signs and symptoms  Outcome: Ongoing     Problem: Pain - Acute:  Goal: Pain level will decrease  Description: Pain level will decrease  Outcome: Ongoing     Problem: Pain:  Goal: Pain level will decrease  Description: Pain level will decrease  Outcome: Ongoing  Goal: Control of acute pain  Description: Control of acute pain  Outcome: Ongoing  Goal: Control of chronic pain  Description: Control of chronic pain  Outcome: Ongoing

## 2021-04-01 NOTE — PROGRESS NOTES
Progress note    Subjective:     Postpartum Day 3:  Delivery    Pain is well controlled with current medications. The patient is ambulating well. The patient is tolerating a normal diet. Flatus has been passed. Objective:     Vitals:    21 1129   BP: (!) 140/62   Pulse: 77   Resp: 18   Temp: 98.2 °F (36.8 °C)   SpO2:          General:    alert, appears stated age and cooperative   Uterine Fundus:   firm   Incision:  healing well, no significant drainage, no dehiscence, no significant erythema        CBC   Lab Results   Component Value Date    WBC 11.4 (H) 2021    HGB 10.0 (L) 2021    HCT 30.5 (L) 2021     2021        Assessment:     Status post  section. Doing well postoperatively.       Plan:     Discharge home with standard precautions and return to clinic in 1 weeks    Natalya Ronquillo MD 2021 3:24 PM

## 2021-04-01 NOTE — PLAN OF CARE
Problem: Discharge Planning:  Goal: Discharged to appropriate level of care  4/1/2021 1759 by Mateus Roman RN  Outcome: Completed  4/1/2021 0839 by Mateus Roman RN  Outcome: Ongoing     Problem: Infection - Surgical Site:  Goal: Will show no infection signs and symptoms  4/1/2021 1759 by Mateus Roman RN  Outcome: Completed  4/1/2021 0839 by Mateus Roman RN  Outcome: Ongoing     Problem: Pain - Acute:  Goal: Pain level will decrease  4/1/2021 1759 by Mateus Roman RN  Outcome: Completed  4/1/2021 0839 by Mateus Roman RN  Outcome: Ongoing     Problem: Pain:  Description: Pain management should include both nonpharmacologic and pharmacologic interventions.   Goal: Pain level will decrease  4/1/2021 1759 by Mateus Roman RN  Outcome: Completed  4/1/2021 0839 by Mateus Roman RN  Outcome: Ongoing  Goal: Control of acute pain  4/1/2021 1759 by Mateus Roman RN  Outcome: Completed  4/1/2021 0839 by Mateus Roman RN  Outcome: Ongoing  Goal: Control of chronic pain  4/1/2021 1759 by Mateus Roman RN  Outcome: Completed  4/1/2021 0839 by Mateus Roman RN  Outcome: Ongoing

## 2021-04-01 NOTE — PROGRESS NOTES
Dr Hollis Brown notified of history of mildly elevated blood pressures throughout stay, including the last two blood pressures of 143/73 and 149/73. No new orders rec'd at this time, he will see patient later today on rounds.

## 2021-04-01 NOTE — DISCHARGE INSTR - ACTIVITY
As tolerated, no heavy lifting, rest as needed. Use cabbage leaves as needed for breast engorgement.

## 2021-04-09 ENCOUNTER — OFFICE VISIT (OUTPATIENT)
Dept: OBGYN CLINIC | Age: 26
End: 2021-04-09
Payer: COMMERCIAL

## 2021-04-09 VITALS — SYSTOLIC BLOOD PRESSURE: 124 MMHG | BODY MASS INDEX: 38.08 KG/M2 | WEIGHT: 195 LBS | DIASTOLIC BLOOD PRESSURE: 74 MMHG

## 2021-04-09 DIAGNOSIS — Z48.89 ENCOUNTER FOR POST SURGICAL WOUND CHECK: Primary | ICD-10-CM

## 2021-04-09 PROCEDURE — G8427 DOCREV CUR MEDS BY ELIG CLIN: HCPCS | Performed by: ADVANCED PRACTICE MIDWIFE

## 2021-04-09 PROCEDURE — 1036F TOBACCO NON-USER: CPT | Performed by: ADVANCED PRACTICE MIDWIFE

## 2021-04-09 PROCEDURE — G8419 CALC BMI OUT NRM PARAM NOF/U: HCPCS | Performed by: ADVANCED PRACTICE MIDWIFE

## 2021-04-09 PROCEDURE — 1111F DSCHRG MED/CURRENT MED MERGE: CPT | Performed by: ADVANCED PRACTICE MIDWIFE

## 2021-04-09 PROCEDURE — 99213 OFFICE O/P EST LOW 20 MIN: CPT | Performed by: ADVANCED PRACTICE MIDWIFE

## 2021-04-09 NOTE — PROGRESS NOTES
Chief Complaint:     Jo Adams is a 27 y.o. female who presents here today for complaints of:      Chief Complaint   Patient presents with    Wound Check     incision check pt has irritation of a burning sensation       History of Present Illness:     Jo Adams is a 27 y.o. female who presents for a Post-Partum visit.   delivery on 3/29/21, she is 11 days post-partum.  Concerns today:  None, doing well.  Constipation:  Denies   Voiding without difficulty   Bleeding:  Light/scant    Past Medical, Surgical, Family, and Social History: Allergies: Other  Patient's last menstrual period was 2020. Obstetrical History:     Contraceptive Method:  abstinence    Past Medical History:   Diagnosis Date    Anxiety     Diet controlled gestational diabetes mellitus (GDM) in third trimester     [facility] ([facility] [address]     Rh incompatibility     recieved rhogam at 16 weeks    Seizures ([facility] [address]     heat seizures     Past Surgical History:   Procedure Laterality Date     SECTION N/A 3/29/2021     SECTION performed by Francine Ny MD at [facility] L[facility]     History reviewed. No pertinent family history. Medications:     Current Outpatient Medications on File Prior to Visit   Medication Sig Dispense Refill    ibuprofen (ADVIL;MOTRIN) 800 MG tablet Take 1 tablet by mouth every 6 hours as needed for Pain 120 tablet 3    acetaminophen (APAP EXTRA STRENGTH) 500 MG tablet Take 2 tablets by mouth every 6 hours as needed for Pain 60 tablet 1    docusate sodium (COLACE) 100 MG capsule Take 1 capsule by mouth 2 times daily as needed for Constipation 60 capsule 2    cyclobenzaprine (FLEXERIL) 10 MG tablet Take 10 mg by mouth as needed       No current facility-administered medications on file prior to visit.       Review of Systems:     Review of Systems  Physical Exam:     Vitals:  /74   Wt 195 lb (88.5 kg)   LMP 2020   BMI 38.08 kg/m²     Physical Exam  Assessment:

## 2021-05-14 ENCOUNTER — OFFICE VISIT (OUTPATIENT)
Dept: OBGYN CLINIC | Age: 26
End: 2021-05-14
Payer: COMMERCIAL

## 2021-05-14 VITALS
WEIGHT: 203 LBS | DIASTOLIC BLOOD PRESSURE: 60 MMHG | HEART RATE: 76 BPM | HEIGHT: 60 IN | BODY MASS INDEX: 39.85 KG/M2 | SYSTOLIC BLOOD PRESSURE: 94 MMHG

## 2021-05-14 RX ORDER — CITALOPRAM 10 MG/1
10 TABLET ORAL DAILY
Qty: 30 TABLET | Refills: 1 | Status: SHIPPED | OUTPATIENT
Start: 2021-05-14 | End: 2022-01-25

## 2021-05-21 ENCOUNTER — TELEPHONE (OUTPATIENT)
Dept: OBGYN CLINIC | Age: 26
End: 2021-05-21

## 2021-05-21 NOTE — TELEPHONE ENCOUNTER
SUBJECTIVE:  Pt would like Nat to call her. She has some questions and would like her opinion about some spotting she's having after     Recently had her first period following her . She continued to have light-moderate spotting through day #10, which was unusual for her. OBJECTIVE:  Primary :  3/29/21    ASSESSMENT:  Post-partum, return of menses    PLAN:  Reassurance given. Keep your next appointment with Dr. Zachariah Lucas on 21.      EDDIE Toussaint CNM

## 2021-05-28 ENCOUNTER — OFFICE VISIT (OUTPATIENT)
Dept: OBGYN CLINIC | Age: 26
End: 2021-05-28
Payer: COMMERCIAL

## 2021-05-28 VITALS — BODY MASS INDEX: 39.06 KG/M2 | SYSTOLIC BLOOD PRESSURE: 124 MMHG | WEIGHT: 200 LBS | DIASTOLIC BLOOD PRESSURE: 72 MMHG

## 2021-05-28 DIAGNOSIS — N90.89 LABIAL LESION: ICD-10-CM

## 2021-05-28 DIAGNOSIS — N94.9 GENITAL LESION, FEMALE: ICD-10-CM

## 2021-05-28 PROCEDURE — G8427 DOCREV CUR MEDS BY ELIG CLIN: HCPCS | Performed by: OBSTETRICS & GYNECOLOGY

## 2021-05-28 PROCEDURE — G8417 CALC BMI ABV UP PARAM F/U: HCPCS | Performed by: OBSTETRICS & GYNECOLOGY

## 2021-05-28 PROCEDURE — 1036F TOBACCO NON-USER: CPT | Performed by: OBSTETRICS & GYNECOLOGY

## 2021-05-28 RX ORDER — DOXYCYCLINE HYCLATE 100 MG
100 TABLET ORAL 2 TIMES DAILY
Qty: 20 TABLET | Refills: 0 | Status: SHIPPED | OUTPATIENT
Start: 2021-05-28 | End: 2021-06-07

## 2021-05-28 NOTE — PROGRESS NOTES
Financial Resource Strain:     Difficulty of Paying Living Expenses:    Food Insecurity:     Worried About Running Out of Food in the Last Year:     [address] in the Last Year:    Transportation Needs:     Lack of Transportation (Medical):  Lack of Transportation (Non-Medical):    Physical Activity:     Days of Exercise per Week:     Minutes of Exercise per Session:    Stress:     Feeling of Stress :    Social Connections:     Frequency of Communication with Friends and Family:     Frequency of Social Gatherings with Friends and Family:     Attends Buddhist Services:     Active Member of Clubs or Organizations:     Attends Club or Organization Meetings:     Marital Status:    Intimate Partner Violence:     Fear of Current or Ex-Partner:     Emotionally Abused:     Physically Abused:     Sexually Abused:        Contraceptive method:  none    Patient's medications, allergies, past medical, surgical, social and family histories were reviewed and updated as appropriate. Review of Systems  As per chief complaint   All other systems reviewed and are negative. Physical Exam:  Vitals:  /72   Wt 200 lb (90.7 kg)   LMP 2021 (Approximate)   BMI 39.06 kg/m²   Lungs: CTAB   Heart : Regular S1/S2, no M/R/G  Abdomen: Soft , NT, ND , + BS   Pelvic exam : Cultures taken, on exam there is a small area of skin denudation, with a yellowish base probably due to pus, no active drainage or bleeding, tenderness is mild no swelling or redness noticed either. Assessment:      Diagnosis Orders   1. Postpartum care following  delivery     2. Labial lesion  Culture, HSV   3. Genital lesion, female     4. Postpartum depression         Plan:     Culture for HSV taken  Otherwise with we will treat as folliculitis, patient directed to use topical Neosporin.  Will be given a course of doxycycline 100 mg p.o. twice daily for 10 days      Orders Placed This Encounter   Procedures    Culture, HSV     Standing Status:   Future     Standing Expiration Date:   5/28/2022     Order Specific Question:   Specimen Source: Answer:   Cervix     Comments:   other     Orders Placed This Encounter   Medications    doxycycline hyclate (VIBRA-TABS) 100 MG tablet     Sig: Take 1 tablet by mouth 2 times daily for 10 days     Dispense:  20 tablet     Refill:  0       Follow Up:  Return if symptoms worsen or fail to improve, for next annual exam visit.         Susanne Mayo MD

## 2021-05-31 LAB
FINAL REPORT: NORMAL
PRELIMINARY: NORMAL

## 2021-06-07 NOTE — PROGRESS NOTES
Comment: socially    Drug use: No    Sexual activity: Yes     Partners: Male   Other Topics Concern    Not on file   Social History Narrative    ** Merged History Encounter **          Social Determinants of Health     Financial Resource Strain:     Difficulty of Paying Living Expenses:    Food Insecurity:     Worried About Running Out of Food in the Last Year:     Ran Out of Food in the Last Year:    Transportation Needs:     Lack of Transportation (Medical):  Lack of Transportation (Non-Medical):    Physical Activity:     Days of Exercise per Week:     Minutes of Exercise per Session:    Stress:     Feeling of Stress :    Social Connections:     Frequency of Communication with Friends and Family:     Frequency of Social Gatherings with Friends and Family:     Attends Hinduism Services:     Active Member of Clubs or Organizations:     Attends Club or Organization Meetings:     Marital Status:    Intimate Partner Violence:     Fear of Current or Ex-Partner:     Emotionally Abused:     Physically Abused:     Sexually Abused:        Contraceptive method:  none    Review of Systems  Review of Systems  Review of Systems   Constitutional:Negative for chills and fever. Respiratory: Negative for cough and shortness of breath. Cardiovascular: Negative for chest pain and palpitations. Gastrointestinal: Negative for nausea and vomiting. Genitourinary: Negative for dysuria, frequency and urgency. Musculoskeletal: Negative for myalgias. Neurological: Negative for dizziness, seizures and headaches. Psychiatric/Behavioral: Negative for depression and suicidal ideas. Physical Exam  Physical Exam  Physical Exam   Constitutional: She is oriented to person, place, and time and well-developed, well-nourished, and in no distress. HENT:   Head: Normocephalic and atraumatic. Eyes: EOM are normal. Pupils are equal, round, and reactive to light. Neck: Normal range of motion. Neck supple. Cardiovascular: Normal rate and regular rhythm. Pulmonary/Chest: Effort normal and breath sounds normal.   Abdominal: Soft. Bowel sounds are normal.   Genitourinary:   Genitourinary Comments: deferred   Neurological: She is alertand oriented to person, place, and time. Psychiatric: Mood, memory, affect and judgment normal.       Assessment:      Diagnosis Orders   1. Postpartum care and examination         Chief Complaint   Patient presents with    Postpartum Care      3/29/21       EPDS Score of 14    Pap :normal    Glucose tolerance: Normal      Body mass index is 39.65 kg/m². Obesity:  Class II  Smoking:  No    Plan:     PP depression , started on celexa   Return in 4 -6 weeks. Obesity Counseling:  Given  Smoking Counseling:  N/A     No orders of the defined types were placed in this encounter. Orders Placed This Encounter   Medications    citalopram (CELEXA) 10 MG tablet     Sig: Take 1 tablet by mouth daily     Dispense:  30 tablet     Refill:  1       1. Return to the office: Return if symptoms worsen or fail to improve, for next annual exam visit. 2. Signs & Symptoms of mastitis reviewed; notify if occurs  3. Secondary smoke risks reviewed. Increased risks of respiratory problems, Sudden     infant death syndrome, and potential malignancies. 4. Family planning counseling and STD counseling completed       Darryle Peeks M.D., F.A.C.O. G

## 2022-01-25 ENCOUNTER — OFFICE VISIT (OUTPATIENT)
Dept: OBGYN CLINIC | Age: 27
End: 2022-01-25
Payer: COMMERCIAL

## 2022-01-25 VITALS
HEART RATE: 68 BPM | WEIGHT: 198 LBS | SYSTOLIC BLOOD PRESSURE: 104 MMHG | DIASTOLIC BLOOD PRESSURE: 78 MMHG | HEIGHT: 60 IN | BODY MASS INDEX: 38.87 KG/M2

## 2022-01-25 DIAGNOSIS — Z11.3 SCREEN FOR STD (SEXUALLY TRANSMITTED DISEASE): ICD-10-CM

## 2022-01-25 DIAGNOSIS — Z76.89 ENCOUNTER TO ESTABLISH CARE WITH NEW DOCTOR: Primary | ICD-10-CM

## 2022-01-25 PROCEDURE — G8484 FLU IMMUNIZE NO ADMIN: HCPCS | Performed by: OBSTETRICS & GYNECOLOGY

## 2022-01-25 PROCEDURE — G8417 CALC BMI ABV UP PARAM F/U: HCPCS | Performed by: OBSTETRICS & GYNECOLOGY

## 2022-01-25 PROCEDURE — 1036F TOBACCO NON-USER: CPT | Performed by: OBSTETRICS & GYNECOLOGY

## 2022-01-25 PROCEDURE — 99213 OFFICE O/P EST LOW 20 MIN: CPT | Performed by: OBSTETRICS & GYNECOLOGY

## 2022-01-25 PROCEDURE — G8427 DOCREV CUR MEDS BY ELIG CLIN: HCPCS | Performed by: OBSTETRICS & GYNECOLOGY

## 2022-01-25 RX ORDER — CITALOPRAM 10 MG/1
20 TABLET ORAL DAILY
Qty: 30 TABLET | Refills: 3 | Status: SHIPPED | OUTPATIENT
Start: 2022-01-25 | End: 2022-04-13 | Stop reason: SINTOL

## 2022-01-25 NOTE — PROGRESS NOTES
STD Mary Lazo is a 27 y.o. female who presents here today for complaints of requesting to be screened for STDs. Patient denies any symptoms. Denies any abnormal vaginal discharge. Denies any fever or chills. Denies any pelvic pain. Patient is sexually active with one partner. Vitals:  /78 (Site: Right Upper Arm, Position: Sitting, Cuff Size: Large Adult)   Pulse 68   Ht 5' (1.524 m)   Wt 198 lb (89.8 kg)   LMP 2022 (Approximate)   BMI 38.67 kg/m²   Allergies: Other  Past Medical History:   Diagnosis Date    Anxiety     Diet controlled gestational diabetes mellitus (GDM) in third trimester     Manic depression ([facility] [address]     Seizures ([facility] [address]     heat seizures     Past Surgical History:   Procedure Laterality Date     SECTION N/A 3/29/2021     SECTION performed by G[facility]ul Land [facility] at [facility]     OB History        2    Para   2    Term   1       1    AB        Living   1       SAB        IAB        Ectopic        Molar        Multiple   0    Live Births   1              No family history on file.   Social History     Socioeconomic History    Marital status: Single     Spouse name: Not on file    Number of children: Not on file    Years of education: Not on file    Highest education level: Not on file   Occupational History    Not on file   Tobacco Use    Smoking status: Former Smoker    Smokeless tobacco: Never Used    Tobacco comment: quit \"2 months prior to pregnancy\"   Vaping Use    Vaping Use: Never used   Substance and Sexual Activity    Alcohol use: Not Currently     Comment: socially    Drug use: No    Sexual activity: Yes     Partners: Male   Other Topics Concern    Not on file   Social History Narrative    ** Merged History Encounter **          Social Determinants of Health     Financial Resource Strain:     Difficulty of Paying Living Expenses: Not on file   Food Insecurity:     Worried About Running Out of Food in the counseled about STDs as follows : The patient was seen and counseled on all sexually transmitted diseases their symptoms and treatments. Barrier recommendations were made. The patient was made aware of all testing options available to her. Framework for sexually transmitted infection risk reduction counseling discussed with patient:   Ask Routinely I obtain a sexual and substance use history for all patients to assess risk. Reinforce confidentiality; in a tactful, clear, and nonjudgmental manner I check address the following : \"To provide the best care, I ask all my patients about their sexual activity - so tell me about your sex life. \"   \"Tell me about your partners. \" (gender, number, new partners, partners with other partners)   \"What types of sex have you been having? \" (vaginal, anal, oral)   \"How do you protect your partners and yourself during sex? \"    Intervene I Provide patients with brief, tailored, behavioral interventions for risk reduction. Discuss risk with patients:   Unprotected sexual activity   Anonymous partners   Patient or partners with recent STI   History of recreational or intravenous drug use (party drugs, methamphetamines)   Exchange of sex for money or drugs   Recent incarceration   Assess patient's knowledge and misconceptions about STI transmission and assess attitudes and beliefs:   \"What are your concerns about giving or getting an STI? \"   Assess circumstances affecting behaviors (what, where, and with whom; triggers): \"What makes it difficult to use condoms with your partners? \"   \"How do you tell your partner about your HIV status/your STI infection? \"   Assess patient's readiness to change   Negotiate a behavioral goal:   \"What is one thing you can do to reduce your risk of getting HIV or another STI? \"   Identify a first step toward the goal that is:   Elim   Incremental   Individualized   Realistic               Orders Placed This Encounter   Procedures    C.trachomatis N.gonorrhoeae DNA     Standing Status:   Future     Standing Expiration Date:   1/25/2023    Trichomonas Screen     Standing Status:   Future     Standing Expiration Date:   1/25/2023    HSV 1/2, DNA PCR     Standing Status:   Future     Standing Expiration Date:   1/25/2023    Ambulatory referral to [facility]     Referral Priority:   Routine     Referral Type:   Consult for Advice and Opinion     Number of Visits Requested:   1     Orders Placed This Encounter   Medications    citalopram (CELEXA) 10 MG tablet     Sig: Take 2 tablets by mouth daily     Dispense:  30 tablet     Refill:  3       Follow Up:  Return in about 4 weeks (around 2/22/2022) for F/U for results.         Neymar Almodovar MD

## 2022-01-27 LAB
CHLAMYDIA TRACHOMATIS AMPLIFIED DET: NEGATIVE
N GONORRHOEAE AMPLIFIED DET: NEGATIVE

## 2022-02-21 ENCOUNTER — OFFICE VISIT (OUTPATIENT)
Dept: FAMILY MEDICINE CLINIC | Age: 27
End: 2022-02-21
Payer: COMMERCIAL

## 2022-02-21 VITALS
OXYGEN SATURATION: 98 % | HEIGHT: 60 IN | BODY MASS INDEX: 38.21 KG/M2 | WEIGHT: 194.6 LBS | DIASTOLIC BLOOD PRESSURE: 80 MMHG | HEART RATE: 55 BPM | RESPIRATION RATE: 16 BRPM | SYSTOLIC BLOOD PRESSURE: 102 MMHG

## 2022-02-21 DIAGNOSIS — Z72.0 NICOTINE VAPOR PRODUCT USER: ICD-10-CM

## 2022-02-21 DIAGNOSIS — Z00.00 ROUTINE ADULT HEALTH MAINTENANCE: ICD-10-CM

## 2022-02-21 DIAGNOSIS — F31.62 BIPOLAR DISORDER, CURRENT EPISODE MIXED, MODERATE (HCC): ICD-10-CM

## 2022-02-21 DIAGNOSIS — Z76.89 ENCOUNTER TO ESTABLISH CARE: Primary | ICD-10-CM

## 2022-02-21 PROCEDURE — G8484 FLU IMMUNIZE NO ADMIN: HCPCS | Performed by: INTERNAL MEDICINE

## 2022-02-21 PROCEDURE — 1036F TOBACCO NON-USER: CPT | Performed by: INTERNAL MEDICINE

## 2022-02-21 PROCEDURE — G8417 CALC BMI ABV UP PARAM F/U: HCPCS | Performed by: INTERNAL MEDICINE

## 2022-02-21 PROCEDURE — 99204 OFFICE O/P NEW MOD 45 MIN: CPT | Performed by: INTERNAL MEDICINE

## 2022-02-21 PROCEDURE — G8427 DOCREV CUR MEDS BY ELIG CLIN: HCPCS | Performed by: INTERNAL MEDICINE

## 2022-02-21 SDOH — ECONOMIC STABILITY: FOOD INSECURITY: WITHIN THE PAST 12 MONTHS, THE FOOD YOU BOUGHT JUST DIDN'T LAST AND YOU DIDN'T HAVE MONEY TO GET MORE.: NEVER TRUE

## 2022-02-21 SDOH — ECONOMIC STABILITY: FOOD INSECURITY: WITHIN THE PAST 12 MONTHS, YOU WORRIED THAT YOUR FOOD WOULD RUN OUT BEFORE YOU GOT MONEY TO BUY MORE.: NEVER TRUE

## 2022-02-21 ASSESSMENT — ENCOUNTER SYMPTOMS
COUGH: 0
VOMITING: 0
WHEEZING: 0
NAUSEA: 0
SHORTNESS OF BREATH: 0
CHEST TIGHTNESS: 0

## 2022-02-21 ASSESSMENT — SOCIAL DETERMINANTS OF HEALTH (SDOH): HOW HARD IS IT FOR YOU TO PAY FOR THE VERY BASICS LIKE FOOD, HOUSING, MEDICAL CARE, AND HEATING?: NOT HARD AT ALL

## 2022-02-21 ASSESSMENT — PATIENT HEALTH QUESTIONNAIRE - PHQ9
4. FEELING TIRED OR HAVING LITTLE ENERGY: 0
SUM OF ALL RESPONSES TO PHQ QUESTIONS 1-9: 3
SUM OF ALL RESPONSES TO PHQ QUESTIONS 1-9: 3
SUM OF ALL RESPONSES TO PHQ9 QUESTIONS 1 & 2: 3
SUM OF ALL RESPONSES TO PHQ QUESTIONS 1-9: 3
7. TROUBLE CONCENTRATING ON THINGS, SUCH AS READING THE NEWSPAPER OR WATCHING TELEVISION: 0
1. LITTLE INTEREST OR PLEASURE IN DOING THINGS: 1
9. THOUGHTS THAT YOU WOULD BE BETTER OFF DEAD, OR OF HURTING YOURSELF: 0
2. FEELING DOWN, DEPRESSED OR HOPELESS: 2
5. POOR APPETITE OR OVEREATING: 0
10. IF YOU CHECKED OFF ANY PROBLEMS, HOW DIFFICULT HAVE THESE PROBLEMS MADE IT FOR YOU TO DO YOUR WORK, TAKE CARE OF THINGS AT HOME, OR GET ALONG WITH OTHER PEOPLE: 0
SUM OF ALL RESPONSES TO PHQ QUESTIONS 1-9: 3
6. FEELING BAD ABOUT YOURSELF - OR THAT YOU ARE A FAILURE OR HAVE LET YOURSELF OR YOUR FAMILY DOWN: 0
8. MOVING OR SPEAKING SO SLOWLY THAT OTHER PEOPLE COULD HAVE NOTICED. OR THE OPPOSITE, BEING SO FIGETY OR RESTLESS THAT YOU HAVE BEEN MOVING AROUND A LOT MORE THAN USUAL: 0

## 2022-02-21 NOTE — PROGRESS NOTES
for Pain 60 tablet 1    cyclobenzaprine (FLEXERIL) 10 MG tablet Take 10 mg by mouth as needed (Patient not taking: Reported on 1/25/2022)       No current facility-administered medications on file prior to visit. Review of Systems   Constitutional: Negative for activity change, chills, diaphoresis, fatigue and fever. Respiratory: Negative for cough, chest tightness, shortness of breath and wheezing. Cardiovascular: Negative for chest pain, palpitations and leg swelling. Gastrointestinal: Negative for nausea and vomiting. Neurological: Negative for dizziness, syncope, light-headedness and headaches. Objective:   /80 (Site: Left Lower Arm, Position: Sitting, Cuff Size: Medium Adult)   Pulse 55   Resp 16   Ht 5' (1.524 m)   Wt 194 lb 9.6 oz (88.3 kg)   SpO2 98%   BMI 38.01 kg/m²     Physical Exam  Constitutional:       General: She is not in acute distress. Appearance: Normal appearance. She is normal weight. She is not diaphoretic. HENT:      Head: Normocephalic and atraumatic. Cardiovascular:      Rate and Rhythm: Normal rate and regular rhythm. Pulses: Normal pulses. Heart sounds: Normal heart sounds. No murmur heard. No friction rub. Pulmonary:      Effort: Pulmonary effort is normal. No respiratory distress. Breath sounds: Normal breath sounds. No wheezing or rhonchi. Chest:      Chest wall: No tenderness. Abdominal:      General: Abdomen is flat. Bowel sounds are normal. There is no distension. Palpations: Abdomen is soft. Tenderness: There is no abdominal tenderness. Musculoskeletal:         General: No tenderness. Normal range of motion. Right lower leg: No edema. Left lower leg: No edema. Neurological:      General: No focal deficit present. Mental Status: She is alert and oriented to person, place, and time. Mental status is at baseline.    Psychiatric:         Mood and Affect: Mood normal.         Behavior: Behavior normal.         Thought Content: Thought content normal.         Judgment: Judgment normal.         Assessment:      Amy Moody was seen today to establish care. Diagnoses and all orders for this visit:    Encounter to establish care        -     Medical history reviewed. -     Medication list reconciled         -     Management of active medical issues as below        -      on lifestyle modifications, risk factor reduction. Bipolar disorder, current episode mixed, moderate ([address]        -     Current symptoms suggestive of possible mixed bipolar disorder but previous Psych history is unclear. -      Will refer patient to Psychiatry for Specialist evaluation to determine optimal therapy. -      Patient to continue on Celexa 20 mg daily for now to prevent discontinuation syndrome pending Psychiatry evaluation.  -     Ambulatory referral to Psychiatry  -     Amb External Referral To Psychiatry    Routine adult health maintenance        -     Routine labs reviewed        -     Counseled on lifestyle modifications. Nicotine vapor product user       -      Counseled on cessation. Health Maintenance   Topic Date Due    Hepatitis C screen  Never done    Varicella vaccine (1 of 2 - 2-dose childhood series) Never done    COVID-19 Vaccine (1) Never done    HPV vaccine (1 - 2-dose series) Never done    Depression Monitoring  Never done    DTaP/Tdap/Td vaccine (1 - Tdap) Never done    Pap smear  Never done    Flu vaccine (1) 09/01/2021    Chlamydia screen  01/25/2023    HIV screen  Completed    Hepatitis A vaccine  Aged Out    Hepatitis B vaccine  Aged Out    Hib vaccine  Aged Out    Meningococcal (ACWY) vaccine  Aged Out    Pneumococcal 0-64 years Vaccine  [employer]:    As above.     Orders Placed This Encounter   Procedures    [facility] - Ponce Mckeon, Psychiatry, MD, [facility]     Referral Priority:   Routine     Referral Type: Eval and Treat     Referral Reason:   Specialty Services Required     Referred to Provider:   Nii Maria     Requested Specialty:   Psychiatry     Number of Visits Requested:   1    Ambulatory referral to Psychiatry     Referral Priority:   Routine     Referral Type:   Psychiatric     Referral Reason:   Specialty Services Required     Referred to Provider:   Vernon Astorga MD     Requested Specialty:   Psychiatry     Number of Visits Requested:   1    Amb External Referral To Psychiatry     Referral Priority:   Routine     Referral Type:   Eval and Treat     Referral Reason:   Specialty Services Required     Requested Specialty:   Psychiatry     Number of Visits Requested:   1     No orders of the defined types were placed in this encounter. Return for F/u in 6 months.       Chidi Reina MD

## 2022-03-18 ENCOUNTER — TELEPHONE (OUTPATIENT)
Dept: FAMILY MEDICINE CLINIC | Age: 27
End: 2022-03-18

## 2022-04-12 ENCOUNTER — TELEPHONE (OUTPATIENT)
Dept: FAMILY MEDICINE CLINIC | Age: 27
End: 2022-04-12

## 2022-04-13 ENCOUNTER — OFFICE VISIT (OUTPATIENT)
Dept: FAMILY MEDICINE CLINIC | Age: 27
End: 2022-04-13
Payer: COMMERCIAL

## 2022-04-13 VITALS
DIASTOLIC BLOOD PRESSURE: 78 MMHG | SYSTOLIC BLOOD PRESSURE: 118 MMHG | HEIGHT: 60 IN | OXYGEN SATURATION: 98 % | WEIGHT: 194 LBS | HEART RATE: 97 BPM | TEMPERATURE: 96 F | BODY MASS INDEX: 38.09 KG/M2

## 2022-04-13 DIAGNOSIS — L02.429 BOIL, THIGH: Primary | ICD-10-CM

## 2022-04-13 DIAGNOSIS — L03.115 CELLULITIS OF RIGHT THIGH: ICD-10-CM

## 2022-04-13 PROCEDURE — G8417 CALC BMI ABV UP PARAM F/U: HCPCS | Performed by: INTERNAL MEDICINE

## 2022-04-13 PROCEDURE — 99213 OFFICE O/P EST LOW 20 MIN: CPT | Performed by: INTERNAL MEDICINE

## 2022-04-13 PROCEDURE — 1036F TOBACCO NON-USER: CPT | Performed by: INTERNAL MEDICINE

## 2022-04-13 PROCEDURE — G8427 DOCREV CUR MEDS BY ELIG CLIN: HCPCS | Performed by: INTERNAL MEDICINE

## 2022-04-13 RX ORDER — CEPHALEXIN 500 MG/1
500 CAPSULE ORAL 4 TIMES DAILY
Qty: 28 CAPSULE | Refills: 0 | Status: SHIPPED | OUTPATIENT
Start: 2022-04-13 | End: 2022-04-20

## 2022-04-13 ASSESSMENT — ENCOUNTER SYMPTOMS
SHORTNESS OF BREATH: 0
COUGH: 0
VOMITING: 0
CHEST TIGHTNESS: 0
NAUSEA: 0
WHEEZING: 0

## 2022-04-13 NOTE — PROGRESS NOTES
keflexSubjective:      Patient ID: Jacy Sanford is a 24 y.o. female who presents today for:  Chief Complaint   Patient presents with    Skin Problem     right side of inner thigh has boil, drainage thick white & bloody tx: 3days tx: washed, cleaned, creame        HPI   Patient presenting with a boil over her right thigh with onset over 4 days. Reports noting a pimple over site at onset which admits to manipulating repetitively. Over the last few days, a boil developed with noted purulent drainage from site. Surrounding area has also become red and swollen. Patient denies fever, chills. No nausea or vomiting reported. Offers no medical complaints otherwise. Past Medical History:   Diagnosis Date    Anxiety     Diet controlled gestational diabetes mellitus (GDM) in third trimester     Manic depression ([address]     Seizures ([address]     heat seizures     Past Surgical History:   Procedure Laterality Date     SECTION N/A 3/29/2021     SECTION performed by Onel Griggs MD at [address] [place] L&D OR     [address]  2021     No family history on file. Allergies   Allergen Reactions    Other      Cherries, strawberries, green apples and artificial flavors of all of the above. Current Outpatient Medications on File Prior to Visit   Medication Sig Dispense Refill    acetaminophen (APAP EXTRA STRENGTH) 500 MG tablet Take 2 tablets by mouth every 6 hours as needed for Pain 60 tablet 1    cyclobenzaprine (FLEXERIL) 10 MG tablet Take 10 mg by mouth as needed        No current facility-administered medications on file prior to visit. Review of Systems   Constitutional: Negative for activity change, chills, diaphoresis, fatigue and fever. Respiratory: Negative for cough, chest tightness, shortness of breath and wheezing. Cardiovascular: Negative for chest pain, palpitations and leg swelling. Gastrointestinal: Negative for nausea and vomiting.    Neurological: Negative for dizziness, syncope, light-headedness and headaches. Objective:   /78   Pulse 97   Temp 96 °F (35.6 °C) (Temporal)   Ht 5' (1.524 m) Comment: per pt  Wt 194 lb (88 kg) Comment: per pt  LMP 03/11/2022   SpO2 98%   BMI 37.89 kg/m²     Physical Exam  Constitutional:       General: She is not in acute distress. Appearance: Normal appearance. She is normal weight. She is not diaphoretic. HENT:      Head: Normocephalic and atraumatic. Cardiovascular:      Rate and Rhythm: Normal rate and regular rhythm. Pulses: Normal pulses. Heart sounds: Normal heart sounds. No murmur heard. No friction rub. Pulmonary:      Effort: Pulmonary effort is normal. No respiratory distress. Breath sounds: Normal breath sounds. No wheezing or rhonchi. Chest:      Chest wall: No tenderness. Abdominal:      General: Abdomen is flat. Bowel sounds are normal. There is no distension. Palpations: Abdomen is soft. Tenderness: There is no abdominal tenderness. Musculoskeletal:         General: No tenderness. Normal range of motion. Right lower leg: No edema. Left lower leg: No edema. Comments: 1.5cm boil over medial aspect of right thigh. Noted punctum without active drainage. Indurated on palpation. Surrounding area erythematous and swollen with differential warmth. Neurological:      Mental Status: She is alert. Assessment:      Francyne Face was seen today for skin problem. Diagnoses and all orders for this visit:    Boil, thigh  Cellulitis of right thigh        -     Clinically suggestive. -     cephALEXin (KEFLEX) 500 MG capsule; Take 1 capsule by mouth 4 times daily for 7 days  -     Advised on signs of worsening infection and encouraged to reach out should that occur.         Health Maintenance   Topic Date Due    Hepatitis C screen  Never done    Varicella vaccine (1 of 2 - 2-dose childhood series) Never done    COVID-19 Vaccine (1) Never done    HPV vaccine (1 - 2-dose series) Never done    DTaP/Tdap/Td vaccine (1 - Tdap) Never done    Pap smear  Never done    Flu vaccine (Season Ended) 09/01/2022    Chlamydia screen  01/25/2023    Depression Monitoring  02/21/2023    HIV screen  Completed    Hepatitis A vaccine  Aged Out    Hepatitis B vaccine  Aged Out    Hib vaccine  Aged Out    Meningococcal (ACWY) vaccine  Aged Out    Pneumococcal 0-64 years Vaccine  [employer]:    As above. No orders of the defined types were placed in this encounter. Orders Placed This Encounter   Medications    cephALEXin (KEFLEX) 500 MG capsule     Sig: Take 1 capsule by mouth 4 times daily for 7 days     Dispense:  28 capsule     Refill:  0       No follow-ups on file.       Jose Alfredo De La Paz MD

## 2022-09-09 ENCOUNTER — OFFICE VISIT (OUTPATIENT)
Dept: OBGYN CLINIC | Age: 27
End: 2022-09-09
Payer: COMMERCIAL

## 2022-09-09 VITALS — BODY MASS INDEX: 34.57 KG/M2 | SYSTOLIC BLOOD PRESSURE: 122 MMHG | WEIGHT: 177 LBS | DIASTOLIC BLOOD PRESSURE: 72 MMHG

## 2022-09-09 DIAGNOSIS — Z20.2 POSSIBLE EXPOSURE TO STD: ICD-10-CM

## 2022-09-09 DIAGNOSIS — Z12.4 ENCOUNTER FOR PAP SMEAR OF CERVIX WITH HPV DNA COTESTING: ICD-10-CM

## 2022-09-09 DIAGNOSIS — R87.612 LGSIL ON PAP SMEAR OF CERVIX: ICD-10-CM

## 2022-09-09 DIAGNOSIS — Z01.419 WOMEN'S ANNUAL ROUTINE GYNECOLOGICAL EXAMINATION: ICD-10-CM

## 2022-09-09 DIAGNOSIS — Z01.419 WOMEN'S ANNUAL ROUTINE GYNECOLOGICAL EXAMINATION: Primary | ICD-10-CM

## 2022-09-09 DIAGNOSIS — R10.2 SUPRAPUBIC PRESSURE: ICD-10-CM

## 2022-09-09 LAB
BILIRUBIN, POC: NORMAL
BLOOD URINE, POC: NORMAL
CLARITY, POC: NORMAL
COLOR, POC: NORMAL
GLUCOSE URINE, POC: NORMAL
KETONES, POC: NORMAL
LEUKOCYTE EST, POC: NORMAL
NITRITE, POC: NORMAL
PH, POC: 6
PROTEIN, POC: NORMAL
SPECIFIC GRAVITY, POC: 1.02
UROBILINOGEN, POC: NORMAL

## 2022-09-09 PROCEDURE — 99395 PREV VISIT EST AGE 18-39: CPT | Performed by: ADVANCED PRACTICE MIDWIFE

## 2022-09-09 PROCEDURE — 81003 URINALYSIS AUTO W/O SCOPE: CPT | Performed by: ADVANCED PRACTICE MIDWIFE

## 2022-09-09 RX ORDER — HYDROCODONE BITARTRATE AND ACETAMINOPHEN 5; 325 MG/1; MG/1
TABLET ORAL
COMMUNITY
Start: 2022-07-05

## 2022-09-09 ASSESSMENT — ENCOUNTER SYMPTOMS
TROUBLE SWALLOWING: 0
VOMITING: 0
COUGH: 0
DIARRHEA: 0
ABDOMINAL PAIN: 0
SHORTNESS OF BREATH: 0
VOICE CHANGE: 0
SORE THROAT: 0
CONSTIPATION: 0
NAUSEA: 0
RHINORRHEA: 0

## 2022-09-09 NOTE — PROGRESS NOTES
The patient was asked if she would like a chaperone present for her intimate exam. She  Declined the chaperone.  Jazmyne Smith MA

## 2022-09-09 NOTE — PROGRESS NOTES
genital sores, menstrual problem, pelvic pain, vaginal bleeding, vaginal discharge and vaginal pain. Musculoskeletal:  Negative for arthralgias and myalgias. Neurological:  Negative for dizziness, syncope and headaches. Hematological:  Negative for adenopathy. All other systems reviewed and are negative. Physical Exam:     Vitals:  /72   Wt 177 lb (80.3 kg)   LMP 08/14/2022   BMI 34.57 kg/m²     Physical Exam  Vitals and nursing note reviewed. Constitutional:       General: She is not in acute distress. Appearance: Normal appearance. She is not ill-appearing, toxic-appearing or diaphoretic. HENT:      Head: Normocephalic. Nose: No congestion or rhinorrhea. Mouth/Throat:      Mouth: Mucous membranes are moist.   Eyes:      General: No scleral icterus. Right eye: No discharge. Left eye: No discharge. Cardiovascular:      Rate and Rhythm: Normal rate and regular rhythm. Pulses: Normal pulses. Pulmonary:      Effort: Pulmonary effort is normal. No respiratory distress. Abdominal:      Palpations: Abdomen is soft. Hernia: There is no hernia in the left inguinal area or right inguinal area. Genitourinary:     General: Normal vulva. Exam position: Lithotomy position. Pubic Area: No rash or pubic lice. Labia:         Right: No rash, tenderness, lesion or injury. Left: No rash, tenderness, lesion or injury. Urethra: No prolapse, urethral pain, urethral swelling or urethral lesion. Vagina: No signs of injury and foreign body. No vaginal discharge, erythema, tenderness, bleeding, lesions or prolapsed vaginal walls. Cervix: No cervical motion tenderness, discharge, friability, lesion, erythema, cervical bleeding or eversion. Uterus: Not deviated, not enlarged, not fixed, not tender and no uterine prolapse. Adnexa:         Right: No mass, tenderness or fullness. Left: No mass, tenderness or fullness. Rectum: No mass or external hemorrhoid. Musculoskeletal:         General: Normal range of motion. Cervical back: Normal range of motion and neck supple. Right lower leg: No edema. Left lower leg: No edema. Lymphadenopathy:      Lower Body: No right inguinal adenopathy. No left inguinal adenopathy. Skin:     General: Skin is warm and dry. Capillary Refill: Capillary refill takes less than 2 seconds. Coloration: Skin is not jaundiced or pale. Neurological:      Mental Status: She is alert and oriented to person, place, and time. Mental status is at baseline. Motor: No weakness. Coordination: Coordination normal.      Gait: Gait normal.   Psychiatric:         Mood and Affect: Mood normal.         Behavior: Behavior normal.     Assessment:      Diagnosis Orders   1. Women's annual routine gynecological examination  PAP SMEAR      2. Encounter for Pap smear of cervix with HPV DNA cotesting  PAP SMEAR      3. LGSIL on Pap smear of cervix  PAP SMEAR      4. Possible exposure to STD  Wet prep, genital    C.trachomatis N.gonorrhoeae DNA, Thin Prep      5. Suprapubic pressure  POCT Urinalysis No Micro (Auto)    Culture, Urine        Plan:     Annual Exam, Screening for STD's  Pap - Collected  Screening for STD's - Collected with Pap    Suprapubic Pressure, Frequency  Urine collected for UA with culture  Treat if indicated    Follow Up:  No follow-ups on file. Orders Placed This Encounter   Procedures    Wet prep, genital     Standing Status:   Future     Standing Expiration Date:   9/9/2023    C.trachomatis N.gonorrhoeae DNA, Thin Prep     Standing Status:   Future     Standing Expiration Date:   9/9/2023    Culture, Urine     Standing Status:   Future     Standing Expiration Date:   9/9/2023     Order Specific Question:   Specify (ex-cath, midstream, cysto, etc)?      Answer:   mid stream    PAP SMEAR     Patient History:    Patient's last menstrual period was 2022. OBGYN Status: Having periods  Past Surgical History:  3/29/2021:  SECTION; N/A      Comment:   SECTION performed by Juana Byrne MD at [facility] [place]                L&D OR  2021:  SECTION      Social History    Tobacco Use      Smoking status: Former      Smokeless tobacco: Never      Tobacco comments: quit \"2 months prior to pregnancy\"       Standing Status:   Future     Standing Expiration Date:   2023     Order Specific Question:   Collection Type     Answer: Thin Prep     Order Specific Question:   Prior Abnormal Pap Test     Answer:   No     Order Specific Question:   Screening or Diagnostic     Answer:   Diagnostic     Order Specific Question:   HPV Requested? Answer:   Yes     Comments:        Order Specific Question:   High Risk Patient     Answer:   N/A    POCT Urinalysis No Micro (Auto)     No orders of the defined types were placed in this encounter.       EDDIE Sunshine - NIDLA

## 2022-09-10 LAB
CLUE CELLS: NORMAL
TRICHOMONAS PREP: NORMAL
TRICHOMONAS VAGINALIS SCREEN: NEGATIVE
URINE CULTURE, ROUTINE: NORMAL
YEAST WET PREP: NORMAL

## 2022-09-15 LAB
C. TRACHOMATIS DNA,THIN PREP: NEGATIVE
HPV COMMENT: ABNORMAL
HPV TYPE 16: NOT DETECTED
HPV TYPE 18: NOT DETECTED
HPVOH (OTHER TYPES): DETECTED
N. GONORRHOEAE DNA, THIN PREP: NEGATIVE

## 2023-11-25 ENCOUNTER — HOSPITAL ENCOUNTER (EMERGENCY)
Age: 28
Discharge: HOME | End: 2023-11-25
Payer: COMMERCIAL

## 2023-11-25 VITALS
HEIGHT: 60 IN | HEART RATE: 68 BPM | WEIGHT: 200 LBS | TEMPERATURE: 98.3 F | OXYGEN SATURATION: 99 % | SYSTOLIC BLOOD PRESSURE: 106 MMHG | RESPIRATION RATE: 17 BRPM | DIASTOLIC BLOOD PRESSURE: 72 MMHG | BODY MASS INDEX: 39.27 KG/M2

## 2023-11-25 DIAGNOSIS — R42 DIZZINESS: Primary | ICD-10-CM

## 2023-11-25 LAB
ALBUMIN SERPL-MCNC: 4.1 G/DL (ref 3.5–4.6)
ALP SERPL-CCNC: 63 U/L (ref 40–130)
ALT SERPL-CCNC: 6 U/L (ref 0–33)
ANION GAP SERPL CALCULATED.3IONS-SCNC: 10 MEQ/L (ref 9–15)
AST SERPL-CCNC: 13 U/L (ref 0–35)
BACTERIA URNS QL MICRO: NEGATIVE /HPF
BASOPHILS # BLD: 0 K/UL (ref 0–0.2)
BASOPHILS NFR BLD: 0.5 %
BILIRUB SERPL-MCNC: 0.8 MG/DL (ref 0.2–0.7)
BILIRUB UR QL STRIP: NEGATIVE
BUN SERPL-MCNC: 11 MG/DL (ref 6–20)
CALCIUM SERPL-MCNC: 9.1 MG/DL (ref 8.5–9.9)
CHLORIDE SERPL-SCNC: 108 MEQ/L (ref 95–107)
CLARITY UR: CLEAR
CO2 SERPL-SCNC: 23 MEQ/L (ref 20–31)
COLOR UR: YELLOW
CREAT SERPL-MCNC: 0.61 MG/DL (ref 0.5–0.9)
EOSINOPHIL # BLD: 0.1 K/UL (ref 0–0.7)
EOSINOPHIL NFR BLD: 1.3 %
EPI CELLS #/AREA URNS AUTO: NORMAL /HPF (ref 0–5)
ERYTHROCYTE [DISTWIDTH] IN BLOOD BY AUTOMATED COUNT: 12.3 % (ref 11.5–14.5)
GLOBULIN SER CALC-MCNC: 2.5 G/DL (ref 2.3–3.5)
GLUCOSE SERPL-MCNC: 101 MG/DL (ref 70–99)
GLUCOSE UR STRIP-MCNC: NEGATIVE MG/DL
HCG, URINE, POC: NEGATIVE
HCG, URINE, POC: NEGATIVE
HCT VFR BLD AUTO: 40.1 % (ref 37–47)
HGB BLD-MCNC: 13.1 G/DL (ref 12–16)
HGB UR QL STRIP: NEGATIVE
HYALINE CASTS #/AREA URNS AUTO: NORMAL /HPF (ref 0–5)
INFLUENZA A BY PCR: NEGATIVE
INFLUENZA B BY PCR: NEGATIVE
KETONES UR STRIP-MCNC: NEGATIVE MG/DL
LEUKOCYTE ESTERASE UR QL STRIP: ABNORMAL
LYMPHOCYTES # BLD: 1.4 K/UL (ref 1–4.8)
LYMPHOCYTES NFR BLD: 22.8 %
Lab: NORMAL
Lab: NORMAL
MCH RBC QN AUTO: 29 PG (ref 27–31.3)
MCHC RBC AUTO-ENTMCNC: 32.7 % (ref 33–37)
MCV RBC AUTO: 88.9 FL (ref 79.4–94.8)
MONOCYTES # BLD: 0.5 K/UL (ref 0.2–0.8)
MONOCYTES NFR BLD: 7.3 %
NEGATIVE QC PASS/FAIL: NORMAL
NEGATIVE QC PASS/FAIL: NORMAL
NEUTROPHILS # BLD: 4.3 K/UL (ref 1.4–6.5)
NEUTS SEG NFR BLD: 67.9 %
NITRITE UR QL STRIP: NEGATIVE
PH UR STRIP: 6.5 [PH] (ref 5–9)
PLATELET # BLD AUTO: 244 K/UL (ref 130–400)
POSITIVE QC PASS/FAIL: NORMAL
POSITIVE QC PASS/FAIL: NORMAL
POTASSIUM SERPL-SCNC: 4 MEQ/L (ref 3.4–4.9)
PROT SERPL-MCNC: 6.6 G/DL (ref 6.3–8)
PROT UR STRIP-MCNC: NEGATIVE MG/DL
RBC # BLD AUTO: 4.51 M/UL (ref 4.2–5.4)
RBC #/AREA URNS AUTO: NORMAL /HPF (ref 0–5)
SARS-COV-2 RDRP RESP QL NAA+PROBE: NOT DETECTED
SODIUM SERPL-SCNC: 141 MEQ/L (ref 135–144)
SP GR UR STRIP: 1.01 (ref 1–1.03)
URINE REFLEX TO CULTURE: ABNORMAL
UROBILINOGEN UR STRIP-ACNC: 1 E.U./DL
WBC # BLD AUTO: 6.3 K/UL (ref 4.8–10.8)
WBC #/AREA URNS AUTO: NORMAL /HPF (ref 0–5)

## 2023-11-25 PROCEDURE — 96360 HYDRATION IV INFUSION INIT: CPT

## 2023-11-25 PROCEDURE — 36415 COLL VENOUS BLD VENIPUNCTURE: CPT

## 2023-11-25 PROCEDURE — 87502 INFLUENZA DNA AMP PROBE: CPT

## 2023-11-25 PROCEDURE — 87635 SARS-COV-2 COVID-19 AMP PRB: CPT

## 2023-11-25 PROCEDURE — 80053 COMPREHEN METABOLIC PANEL: CPT

## 2023-11-25 PROCEDURE — 2580000003 HC RX 258: Performed by: PHYSICIAN ASSISTANT

## 2023-11-25 PROCEDURE — 85025 COMPLETE CBC W/AUTO DIFF WBC: CPT

## 2023-11-25 PROCEDURE — 6370000000 HC RX 637 (ALT 250 FOR IP): Performed by: PHYSICIAN ASSISTANT

## 2023-11-25 PROCEDURE — 99284 EMERGENCY DEPT VISIT MOD MDM: CPT

## 2023-11-25 PROCEDURE — 96361 HYDRATE IV INFUSION ADD-ON: CPT

## 2023-11-25 PROCEDURE — 81001 URINALYSIS AUTO W/SCOPE: CPT

## 2023-11-25 RX ORDER — MECLIZINE HYDROCHLORIDE 25 MG/1
25 TABLET ORAL 3 TIMES DAILY PRN
Qty: 9 TABLET | Refills: 0 | Status: SHIPPED | OUTPATIENT
Start: 2023-11-25

## 2023-11-25 RX ORDER — MECLIZINE HYDROCHLORIDE 25 MG/1
25 TABLET ORAL ONCE
Status: COMPLETED | OUTPATIENT
Start: 2023-11-25 | End: 2023-11-25

## 2023-11-25 RX ORDER — 0.9 % SODIUM CHLORIDE 0.9 %
1000 INTRAVENOUS SOLUTION INTRAVENOUS ONCE
Status: COMPLETED | OUTPATIENT
Start: 2023-11-25 | End: 2023-11-25

## 2023-11-25 RX ADMIN — MECLIZINE HYDROCHLORIDE 25 MG: 25 TABLET ORAL at 11:03

## 2023-11-25 RX ADMIN — SODIUM CHLORIDE 1000 ML: 9 INJECTION, SOLUTION INTRAVENOUS at 11:02

## 2023-11-25 ASSESSMENT — ENCOUNTER SYMPTOMS
SHORTNESS OF BREATH: 0
EYE DISCHARGE: 0
VOMITING: 0
CONSTIPATION: 0
COLOR CHANGE: 0
NAUSEA: 0
ABDOMINAL PAIN: 0
ABDOMINAL DISTENTION: 0
RHINORRHEA: 0
SORE THROAT: 0

## 2023-11-25 ASSESSMENT — PAIN - FUNCTIONAL ASSESSMENT: PAIN_FUNCTIONAL_ASSESSMENT: NONE - DENIES PAIN

## 2023-11-25 NOTE — ED TRIAGE NOTES
The patient came to the ED for dizziness that began this morning. Pt state the dizziness makes her nauseated. No recent head injury.

## 2023-11-25 NOTE — ED PROVIDER NOTES
and nursing note reviewed. Constitutional:       General: She is not in acute distress. Appearance: She is well-developed. She is not ill-appearing, toxic-appearing or diaphoretic. HENT:      Head: Normocephalic. Right Ear: Tympanic membrane normal.      Left Ear: Tympanic membrane normal.      Nose: Nose normal. No congestion. Mouth/Throat:      Mouth: Mucous membranes are moist.      Pharynx: No oropharyngeal exudate or posterior oropharyngeal erythema. Eyes:      Extraocular Movements: Extraocular movements intact. Conjunctiva/sclera: Conjunctivae normal.      Pupils: Pupils are equal, round, and reactive to light. Neck:      Vascular: No JVD. Trachea: No tracheal deviation. Cardiovascular:      Rate and Rhythm: Normal rate. Pulses: Normal pulses. Heart sounds: Normal heart sounds. No murmur heard. No friction rub. No gallop. Pulmonary:      Effort: Pulmonary effort is normal. No tachypnea, accessory muscle usage, respiratory distress or retractions. Breath sounds: Normal breath sounds. No stridor. No wheezing, rhonchi or rales. Comments: Lung sounds are clear in all fields, there is no wheezes rales or rhonchi, no accessory muscle use, no retractions, room air saturations are 97%  Chest:      Chest wall: No tenderness. Abdominal:      General: Abdomen is flat. Bowel sounds are normal. There is no distension or abdominal bruit. Palpations: There is no shifting dullness, fluid wave, hepatomegaly, splenomegaly, mass or pulsatile mass. Tenderness: There is no abdominal tenderness. There is no right CVA tenderness, left CVA tenderness, guarding or rebound. Negative signs include Nunn's sign, Rovsing's sign and McBurney's sign. Musculoskeletal:         General: No deformity. Cervical back: Normal range of motion and neck supple. No rigidity. Skin:     General: Skin is warm and dry.       Capillary Refill: Capillary refill takes less than 2 seconds. Coloration: Skin is not jaundiced. Neurological:      General: No focal deficit present. Mental Status: She is alert and oriented to person, place, and time. Mental status is at baseline. Cranial Nerves: No cranial nerve deficit. Sensory: No sensory deficit. Motor: No weakness. Coordination: Coordination normal.      Comments: Patient is alert, oriented, neurologically intact, no facial droop, no slurring speech, no drift upper or lower extremities. Patient states increasing dizziness with rotation, flexion extension of the head. There is no meningeal signs.    Psychiatric:         Mood and Affect: Mood normal.         RESULTS     EKG: All EKG's are interpreted by the Emergency Department Physician who either signs or Co-signsthis chart in the absence of a cardiologist.        RADIOLOGY:   [place] such as CT, Ultrasound and MRI are read by the radiologist. Plain radiographic images are visualized and preliminarily interpreted by the emergency physician with the below findings:      Interpretation per the Radiologist below, if available at the time ofthis note:    No orders to display         ED BEDSIDE ULTRASOUND:   Performed by ED Physician - none    LABS:  Labs Reviewed   CBC WITH AUTO DIFFERENTIAL - Abnormal; Notable for the following components:       Result Value    MCHC 32.7 (*)     All other components within normal limits   COMPREHENSIVE METABOLIC PANEL - Abnormal; Notable for the following components:    Chloride 108 (*)     Glucose 101 (*)     Total Bilirubin 0.8 (*)     All other components within normal limits   URINALYSIS WITH REFLEX TO CULTURE - Abnormal; Notable for the following components:    Leukocyte Esterase, Urine TRACE (*)     All other components within normal limits   COVID-19, RAPID   RAPID INFLUENZA A/B ANTIGENS   MICROSCOPIC URINALYSIS   POC PREGNANCY UR-QUAL   POC PREGNANCY UR-QUAL       All other labs were within normal range or

## 2024-08-07 ENCOUNTER — OFFICE VISIT (OUTPATIENT)
Dept: FAMILY MEDICINE CLINIC | Age: 29
End: 2024-08-07
Payer: COMMERCIAL

## 2024-08-07 VITALS
WEIGHT: 200 LBS | DIASTOLIC BLOOD PRESSURE: 80 MMHG | OXYGEN SATURATION: 97 % | BODY MASS INDEX: 39.27 KG/M2 | RESPIRATION RATE: 16 BRPM | HEIGHT: 60 IN | SYSTOLIC BLOOD PRESSURE: 138 MMHG | HEART RATE: 94 BPM

## 2024-08-07 DIAGNOSIS — R63.5 WEIGHT GAIN: Primary | ICD-10-CM

## 2024-08-07 DIAGNOSIS — F90.9 ATTENTION DEFICIT HYPERACTIVITY DISORDER (ADHD), UNSPECIFIED ADHD TYPE: ICD-10-CM

## 2024-08-07 DIAGNOSIS — F31.81 BIPOLAR 2 DISORDER (HCC): ICD-10-CM

## 2024-08-07 DIAGNOSIS — F14.11 HISTORY OF COCAINE ABUSE (HCC): ICD-10-CM

## 2024-08-07 DIAGNOSIS — Z86.32 HISTORY OF GESTATIONAL DIABETES: ICD-10-CM

## 2024-08-07 PROCEDURE — 99214 OFFICE O/P EST MOD 30 MIN: CPT | Performed by: PHYSICIAN ASSISTANT

## 2024-08-07 PROCEDURE — 1036F TOBACCO NON-USER: CPT | Performed by: PHYSICIAN ASSISTANT

## 2024-08-07 PROCEDURE — G8427 DOCREV CUR MEDS BY ELIG CLIN: HCPCS | Performed by: PHYSICIAN ASSISTANT

## 2024-08-07 PROCEDURE — G8419 CALC BMI OUT NRM PARAM NOF/U: HCPCS | Performed by: PHYSICIAN ASSISTANT

## 2024-08-07 SDOH — ECONOMIC STABILITY: HOUSING INSECURITY
IN THE LAST 12 MONTHS, WAS THERE A TIME WHEN YOU DID NOT HAVE A STEADY PLACE TO SLEEP OR SLEPT IN A SHELTER (INCLUDING NOW)?: NO

## 2024-08-07 SDOH — ECONOMIC STABILITY: FOOD INSECURITY: WITHIN THE PAST 12 MONTHS, YOU WORRIED THAT YOUR FOOD WOULD RUN OUT BEFORE YOU GOT MONEY TO BUY MORE.: NEVER TRUE

## 2024-08-07 SDOH — ECONOMIC STABILITY: FOOD INSECURITY: WITHIN THE PAST 12 MONTHS, THE FOOD YOU BOUGHT JUST DIDN'T LAST AND YOU DIDN'T HAVE MONEY TO GET MORE.: NEVER TRUE

## 2024-08-07 SDOH — ECONOMIC STABILITY: INCOME INSECURITY: HOW HARD IS IT FOR YOU TO PAY FOR THE VERY BASICS LIKE FOOD, HOUSING, MEDICAL CARE, AND HEATING?: NOT HARD AT ALL

## 2024-08-07 ASSESSMENT — PATIENT HEALTH QUESTIONNAIRE - PHQ9
SUM OF ALL RESPONSES TO PHQ QUESTIONS 1-9: 1
SUM OF ALL RESPONSES TO PHQ QUESTIONS 1-9: 1
1. LITTLE INTEREST OR PLEASURE IN DOING THINGS: NOT AT ALL
SUM OF ALL RESPONSES TO PHQ QUESTIONS 1-9: 1
SUM OF ALL RESPONSES TO PHQ QUESTIONS 1-9: 1
SUM OF ALL RESPONSES TO PHQ9 QUESTIONS 1 & 2: 1
2. FEELING DOWN, DEPRESSED OR HOPELESS: SEVERAL DAYS

## 2024-08-07 ASSESSMENT — ENCOUNTER SYMPTOMS
NAUSEA: 0
CHEST TIGHTNESS: 0
ABDOMINAL PAIN: 0
BLOOD IN STOOL: 0
DIARRHEA: 0
VOMITING: 0
PHOTOPHOBIA: 0
SHORTNESS OF BREATH: 0

## 2024-08-07 NOTE — PROGRESS NOTES
Subjective  Abimbola Levin, 29 y.o. female presents today with:  Chief Complaint   Patient presents with    Annual Exam     Would like to discuss her health, discuss her weight would like to try weight loss medication        HPI  New patient.   Was seeing Dr. Kincaid.     On no medications.  Would like to discuss weight loss medication.  History of bipolar 2 disorder, cocaine/opioid addiction (will be 5 years sober, went through a rehab program).   History of gestational diabetes, diet controlled.  Has a 3 y/o son.  Manager at [employer].   Occasional vapes nicotine, does use marijuana pen to help with night time paranoia.    Review of Systems   Constitutional:  Positive for unexpected weight change. Negative for appetite change, chills, fatigue and fever.   HENT:  Negative for hearing loss and nosebleeds.    Eyes:  Negative for photophobia and visual disturbance.   Respiratory:  Negative for chest tightness and shortness of breath.    Cardiovascular:  Negative for chest pain and leg swelling.   Gastrointestinal:  Negative for abdominal pain, blood in stool, diarrhea, nausea and vomiting.   Endocrine: Negative for cold intolerance and heat intolerance.   Genitourinary:  Negative for dysuria, hematuria, menstrual problem and urgency.   Musculoskeletal:  Negative for arthralgias, joint swelling and myalgias.   Skin:  Negative for rash.   Neurological:  Negative for dizziness and headaches.   Psychiatric/Behavioral:  Negative for agitation, dysphoric mood and sleep disturbance. The patient is nervous/anxious.        Past Medical History:   Diagnosis Date    Anxiety     Diet controlled gestational diabetes mellitus (GDM) in third trimester     Manic depression (HCC)     Seizures (HCC)     heat seizures     Past Surgical History:   Procedure Laterality Date     SECTION N/A 3/29/2021     SECTION performed by Loli Brock MD at [facility]&D [facility]     SECTION  2021     Social History

## 2024-10-04 DIAGNOSIS — R63.5 WEIGHT GAIN: ICD-10-CM

## 2024-10-04 DIAGNOSIS — F31.81 BIPOLAR 2 DISORDER (HCC): ICD-10-CM

## 2024-10-04 DIAGNOSIS — Z86.32 HISTORY OF GESTATIONAL DIABETES: ICD-10-CM

## 2024-10-04 LAB
ALBUMIN SERPL-MCNC: 4.3 G/DL (ref 3.5–4.6)
ALP SERPL-CCNC: 75 U/L (ref 40–130)
ALT SERPL-CCNC: 10 U/L (ref 0–33)
ANION GAP SERPL CALCULATED.3IONS-SCNC: 12 MEQ/L (ref 9–15)
AST SERPL-CCNC: 12 U/L (ref 0–35)
BILIRUB SERPL-MCNC: 0.5 MG/DL (ref 0.2–0.7)
BUN SERPL-MCNC: 10 MG/DL (ref 6–20)
CALCIUM SERPL-MCNC: 9.2 MG/DL (ref 8.5–9.9)
CHLORIDE SERPL-SCNC: 107 MEQ/L (ref 95–107)
CHOLEST SERPL-MCNC: 164 MG/DL (ref 0–199)
CO2 SERPL-SCNC: 21 MEQ/L (ref 20–31)
CREAT SERPL-MCNC: 0.69 MG/DL (ref 0.5–0.9)
ERYTHROCYTE [DISTWIDTH] IN BLOOD BY AUTOMATED COUNT: 12.8 % (ref 11.5–14.5)
GLOBULIN SER CALC-MCNC: 2.5 G/DL (ref 2.3–3.5)
GLUCOSE SERPL-MCNC: 94 MG/DL (ref 70–99)
HCT VFR BLD AUTO: 39.5 % (ref 37–47)
HDLC SERPL-MCNC: 37 MG/DL (ref 40–59)
HGB BLD-MCNC: 13.6 G/DL (ref 12–16)
LDL CHOLESTEROL: 99 MG/DL (ref 0–129)
MCH RBC QN AUTO: 29.8 PG (ref 27–31.3)
MCHC RBC AUTO-ENTMCNC: 34.4 % (ref 33–37)
MCV RBC AUTO: 86.4 FL (ref 79.4–94.8)
PLATELET # BLD AUTO: 250 K/UL (ref 130–400)
POTASSIUM SERPL-SCNC: 3.7 MEQ/L (ref 3.4–4.9)
PROT SERPL-MCNC: 6.8 G/DL (ref 6.3–8)
RBC # BLD AUTO: 4.57 M/UL (ref 4.2–5.4)
SODIUM SERPL-SCNC: 140 MEQ/L (ref 135–144)
T4 FREE SERPL-MCNC: 1.08 NG/DL (ref 0.84–1.68)
TRIGLYCERIDE, FASTING: 142 MG/DL (ref 0–150)
TSH SERPL-MCNC: 1.11 UIU/ML (ref 0.44–3.86)
WBC # BLD AUTO: 6.8 K/UL (ref 4.8–10.8)

## 2024-10-05 LAB
ESTIMATED AVERAGE GLUCOSE: 97 MG/DL
HBA1C MFR BLD: 5 % (ref 4–6)

## 2024-10-07 ENCOUNTER — OFFICE VISIT (OUTPATIENT)
Dept: FAMILY MEDICINE CLINIC | Age: 29
End: 2024-10-07
Payer: COMMERCIAL

## 2024-10-07 VITALS
OXYGEN SATURATION: 98 % | HEIGHT: 60 IN | SYSTOLIC BLOOD PRESSURE: 118 MMHG | BODY MASS INDEX: 39.85 KG/M2 | DIASTOLIC BLOOD PRESSURE: 88 MMHG | WEIGHT: 203 LBS | RESPIRATION RATE: 16 BRPM | HEART RATE: 76 BPM

## 2024-10-07 DIAGNOSIS — F31.81 BIPOLAR 2 DISORDER (HCC): ICD-10-CM

## 2024-10-07 DIAGNOSIS — R63.5 WEIGHT GAIN: ICD-10-CM

## 2024-10-07 DIAGNOSIS — Z86.32 HISTORY OF GESTATIONAL DIABETES: ICD-10-CM

## 2024-10-07 PROBLEM — O24.410 DIET CONTROLLED GESTATIONAL DIABETES MELLITUS (GDM) IN THIRD TRIMESTER: Status: RESOLVED | Noted: 2021-03-02 | Resolved: 2024-10-07

## 2024-10-07 LAB
INSULIN FREE SERPL-ACNC: 29 UIU/ML (ref 3–25)
INSULIN SERPL-ACNC: 35 UIU/ML (ref 3–25)
THYROPEROXIDASE IGG SERPL-ACNC: <4 IU/ML (ref 0–25)

## 2024-10-07 PROCEDURE — 4004F PT TOBACCO SCREEN RCVD TLK: CPT | Performed by: PHYSICIAN ASSISTANT

## 2024-10-07 PROCEDURE — G8417 CALC BMI ABV UP PARAM F/U: HCPCS | Performed by: PHYSICIAN ASSISTANT

## 2024-10-07 PROCEDURE — 99213 OFFICE O/P EST LOW 20 MIN: CPT | Performed by: PHYSICIAN ASSISTANT

## 2024-10-07 PROCEDURE — G8484 FLU IMMUNIZE NO ADMIN: HCPCS | Performed by: PHYSICIAN ASSISTANT

## 2024-10-07 PROCEDURE — G8427 DOCREV CUR MEDS BY ELIG CLIN: HCPCS | Performed by: PHYSICIAN ASSISTANT

## 2024-10-07 RX ORDER — PHENTERMINE HYDROCHLORIDE 37.5 MG/1
37.5 TABLET ORAL
Qty: 30 TABLET | Refills: 2 | Status: SHIPPED | OUTPATIENT
Start: 2024-10-07 | End: 2025-01-05

## 2024-10-07 ASSESSMENT — ENCOUNTER SYMPTOMS
SHORTNESS OF BREATH: 0
VOMITING: 0
BLOOD IN STOOL: 0
CHEST TIGHTNESS: 0
ABDOMINAL PAIN: 0
NAUSEA: 0
DIARRHEA: 0
PHOTOPHOBIA: 0

## 2024-10-07 NOTE — PROGRESS NOTES
Social Determinants of Health     Financial Resource Strain: Low Risk  (8/7/2024)    Overall Financial Resource Strain (CARDIA)     Difficulty of Paying Living Expenses: Not hard at all   Food Insecurity: No Food Insecurity (8/7/2024)    Hunger Vital Sign     Worried About Running Out of Food in the Last Year: Never true     Ran Out of Food in the Last Year: Never true   Transportation Needs: Unknown (8/7/2024)    PRAPARE - Transportation     Lack of Transportation (Medical): Not on file     Lack of Transportation (Non-Medical): No   Physical Activity: Not on file   Stress: Not on file   Social Connections: Not on file   Intimate Partner Violence: Not on file   Housing Stability: Unknown (8/7/2024)    Housing Stability Vital Sign     Unable to Pay for Housing in the Last Year: Not on file     Number of Places Lived in the Last Year: Not on file     Unstable Housing in the Last Year: No     No family history on file.  Allergies   Allergen Reactions    Other      Cherries, strawberries, green apples and artificial flavors of all of the above.     Current Outpatient Medications   Medication Sig Dispense Refill    phentermine (ADIPEX-P) 37.5 MG tablet Take 1 tablet by mouth every morning (before breakfast) for 90 days. Max Daily Amount: 37.5 mg 30 tablet 2     No current facility-administered medications for this visit.     PMH, Surgical Hx, Family Hx, and Social Hx reviewed and updated.  Health Maintenance reviewed.    Objective  Vitals:    10/07/24 0858   BP: 118/88   Site: Right Upper Arm   Position: Sitting   Cuff Size: Large Adult   Pulse: 76   Resp: 16   SpO2: 98%   Weight: 92.1 kg (203 lb)   Height: 1.524 m (5')     BP Readings from Last 3 Encounters:   10/07/24 118/88   08/07/24 138/80   11/25/23 106/72     Wt Readings from Last 3 Encounters:   10/07/24 92.1 kg (203 lb)   08/07/24 90.7 kg (200 lb)   11/25/23 90.7 kg (200 lb)     Physical Exam  Vitals reviewed.   Constitutional:       General: She is not in

## 2024-10-08 LAB — TSI SER-ACNC: <0.1 IU/L

## 2024-10-21 DIAGNOSIS — R73.09 ABNORMAL GLUCOSE: ICD-10-CM

## 2024-10-21 DIAGNOSIS — E88.819 INSULIN RESISTANCE: Primary | ICD-10-CM

## 2025-01-07 ENCOUNTER — TELEPHONE (OUTPATIENT)
Age: 30
End: 2025-01-07

## 2025-06-24 ENCOUNTER — HOSPITAL ENCOUNTER (EMERGENCY)
Age: 30
Discharge: HOME | End: 2025-06-24
Payer: MEDICAID

## 2025-06-24 VITALS
TEMPERATURE: 98.8 F | DIASTOLIC BLOOD PRESSURE: 90 MMHG | RESPIRATION RATE: 16 BRPM | OXYGEN SATURATION: 97 % | HEIGHT: 60 IN | SYSTOLIC BLOOD PRESSURE: 113 MMHG | WEIGHT: 198.2 LBS | HEART RATE: 68 BPM | BODY MASS INDEX: 38.91 KG/M2

## 2025-06-24 DIAGNOSIS — H93.8X1 EAR FULLNESS, RIGHT: Primary | ICD-10-CM

## 2025-06-24 PROCEDURE — 69209 REMOVE IMPACTED EAR WAX UNI: CPT

## 2025-06-24 PROCEDURE — 99283 EMERGENCY DEPT VISIT LOW MDM: CPT

## 2025-06-24 RX ORDER — CETIRIZINE HYDROCHLORIDE 10 MG/1
10 TABLET ORAL DAILY
Qty: 30 TABLET | Refills: 0 | Status: SHIPPED | OUTPATIENT
Start: 2025-06-24

## 2025-06-24 ASSESSMENT — PAIN - FUNCTIONAL ASSESSMENT
PAIN_FUNCTIONAL_ASSESSMENT: NONE - DENIES PAIN
PAIN_FUNCTIONAL_ASSESSMENT: NONE - DENIES PAIN

## 2025-06-24 ASSESSMENT — ENCOUNTER SYMPTOMS
RESPIRATORY NEGATIVE: 1
TROUBLE SWALLOWING: 0
EYES NEGATIVE: 1
ALLERGIC/IMMUNOLOGIC NEGATIVE: 1
GASTROINTESTINAL NEGATIVE: 1

## 2025-06-24 ASSESSMENT — LIFESTYLE VARIABLES
HOW MANY STANDARD DRINKS CONTAINING ALCOHOL DO YOU HAVE ON A TYPICAL DAY: PATIENT DOES NOT DRINK
HOW OFTEN DO YOU HAVE A DRINK CONTAINING ALCOHOL: NEVER

## 2025-06-24 NOTE — ED PROVIDER NOTES
Height: 1.53 m (5' 0.25\")       PROCEDURES:  Unlessotherwise noted below, none     Ear Wax Removal    Date/Time: 6/24/2025 6:24 PM    Performed by: Sirena Clarke APRN - CNP  Authorized by: Sirena Clarke APRN - CNP    Consent:     Consent obtained:  Verbal    Consent given by:  Patient    Risks, benefits, and alternatives were discussed: yes      Risks discussed:  Infection, pain, bleeding, TM perforation, dizziness and incomplete removal    Alternatives discussed:  Delayed treatment, no treatment and observation  Universal protocol:     Procedure explained and questions answered to patient or proxy's satisfaction: yes      Relevant documents present and verified: yes      Immediately prior to procedure, a time out was called: yes      Patient identity confirmed:  Verbally with patient  Procedure details:     Location:  R ear    Procedure type: irrigation      Procedure outcomes: cerumen removed    Post-procedure details:     Inspection:  TM intact and no bleeding    Procedure completion:  Tolerated well, no immediate complications      This is a 29 y/o female per chart review has ah/o of anxiety is here today for right ear fullness that started yesterday. She reports she has tried to clean it out herself with Q-tips and drops but has been unsuccessful. This happens yearly and she needs her ear cleaned out. She denies fever, chills, N/V/D, difficulty hearing or changes in hearing, possibility of pregnancy.       FINAL IMPRESSION      1. Ear fullness, right          DISPOSITION/PLAN   DISPOSITION           Nursing notes, medical records and triage notes reviewed. Vital signs reviewed.      Patient is afebrile, nontoxic, and hemodynamically stable at time of discharge. Patient verbalizes understanding and is in agreement with this plan.     The patient and/or family  -had the results of all tests and diagnosis explained to them  -Given both verbal and written discharge instructions  -Were instructed of

## 2025-06-24 NOTE — ED TRIAGE NOTES
A & Ox4. Skin pink warm and dry. Pt states she feels like her right ear is completely blocked. States she used ear wax removal drops about an hour ago but no relief yet.

## 2025-08-06 ENCOUNTER — TELEPHONE (OUTPATIENT)
Age: 30
End: 2025-08-06

## 2025-08-27 ENCOUNTER — OFFICE VISIT (OUTPATIENT)
Age: 30
End: 2025-08-27
Payer: COMMERCIAL

## 2025-08-27 VITALS
SYSTOLIC BLOOD PRESSURE: 122 MMHG | HEART RATE: 77 BPM | BODY MASS INDEX: 39.34 KG/M2 | OXYGEN SATURATION: 97 % | WEIGHT: 200.4 LBS | HEIGHT: 60 IN | RESPIRATION RATE: 16 BRPM | DIASTOLIC BLOOD PRESSURE: 70 MMHG

## 2025-08-27 DIAGNOSIS — Z86.32 HISTORY OF GESTATIONAL DIABETES: ICD-10-CM

## 2025-08-27 DIAGNOSIS — R73.09 ABNORMAL GLUCOSE: ICD-10-CM

## 2025-08-27 DIAGNOSIS — F31.81 BIPOLAR 2 DISORDER (HCC): Primary | ICD-10-CM

## 2025-08-27 DIAGNOSIS — E88.819 INSULIN RESISTANCE: ICD-10-CM

## 2025-08-27 PROBLEM — F14.11 HISTORY OF COCAINE ABUSE (HCC): Status: RESOLVED | Noted: 2024-08-07 | Resolved: 2025-08-27

## 2025-08-27 PROCEDURE — G8417 CALC BMI ABV UP PARAM F/U: HCPCS | Performed by: PHYSICIAN ASSISTANT

## 2025-08-27 PROCEDURE — 99214 OFFICE O/P EST MOD 30 MIN: CPT | Performed by: PHYSICIAN ASSISTANT

## 2025-08-27 PROCEDURE — G8427 DOCREV CUR MEDS BY ELIG CLIN: HCPCS | Performed by: PHYSICIAN ASSISTANT

## 2025-08-27 PROCEDURE — 1036F TOBACCO NON-USER: CPT | Performed by: PHYSICIAN ASSISTANT

## 2025-08-27 RX ORDER — ONDANSETRON 4 MG/1
4 TABLET, ORALLY DISINTEGRATING ORAL 3 TIMES DAILY PRN
Qty: 21 TABLET | Refills: 0 | Status: SHIPPED | OUTPATIENT
Start: 2025-08-27

## 2025-08-27 SDOH — ECONOMIC STABILITY: FOOD INSECURITY: WITHIN THE PAST 12 MONTHS, THE FOOD YOU BOUGHT JUST DIDN'T LAST AND YOU DIDN'T HAVE MONEY TO GET MORE.: NEVER TRUE

## 2025-08-27 SDOH — ECONOMIC STABILITY: FOOD INSECURITY: WITHIN THE PAST 12 MONTHS, YOU WORRIED THAT YOUR FOOD WOULD RUN OUT BEFORE YOU GOT MONEY TO BUY MORE.: NEVER TRUE

## 2025-08-27 ASSESSMENT — PATIENT HEALTH QUESTIONNAIRE - PHQ9
3. TROUBLE FALLING OR STAYING ASLEEP: NOT AT ALL
SUM OF ALL RESPONSES TO PHQ QUESTIONS 1-9: 0
8. MOVING OR SPEAKING SO SLOWLY THAT OTHER PEOPLE COULD HAVE NOTICED. OR THE OPPOSITE, BEING SO FIGETY OR RESTLESS THAT YOU HAVE BEEN MOVING AROUND A LOT MORE THAN USUAL: NOT AT ALL
SUM OF ALL RESPONSES TO PHQ QUESTIONS 1-9: 0
1. LITTLE INTEREST OR PLEASURE IN DOING THINGS: NOT AT ALL
4. FEELING TIRED OR HAVING LITTLE ENERGY: NOT AT ALL
10. IF YOU CHECKED OFF ANY PROBLEMS, HOW DIFFICULT HAVE THESE PROBLEMS MADE IT FOR YOU TO DO YOUR WORK, TAKE CARE OF THINGS AT HOME, OR GET ALONG WITH OTHER PEOPLE: NOT DIFFICULT AT ALL
5. POOR APPETITE OR OVEREATING: NOT AT ALL
7. TROUBLE CONCENTRATING ON THINGS, SUCH AS READING THE NEWSPAPER OR WATCHING TELEVISION: NOT AT ALL
SUM OF ALL RESPONSES TO PHQ QUESTIONS 1-9: 0
SUM OF ALL RESPONSES TO PHQ QUESTIONS 1-9: 0
9. THOUGHTS THAT YOU WOULD BE BETTER OFF DEAD, OR OF HURTING YOURSELF: NOT AT ALL
2. FEELING DOWN, DEPRESSED OR HOPELESS: NOT AT ALL
6. FEELING BAD ABOUT YOURSELF - OR THAT YOU ARE A FAILURE OR HAVE LET YOURSELF OR YOUR FAMILY DOWN: NOT AT ALL

## 2025-08-27 ASSESSMENT — ENCOUNTER SYMPTOMS
CHEST TIGHTNESS: 0
PHOTOPHOBIA: 0
BLOOD IN STOOL: 0
SHORTNESS OF BREATH: 0
DIARRHEA: 0
ABDOMINAL PAIN: 0
NAUSEA: 0
VOMITING: 0

## (undated) DEVICE — STERILE NEOPRENE POWDER-FREE SURGICAL GLOVES WITH NITRILE COATING: Brand: PROTEXIS

## (undated) DEVICE — STAPLER SKIN SQ 30 ABSRB STPL DISP INSORB

## (undated) DEVICE — SUTURE VCRL SZ 0 L36IN ABSRB VLT L36MM CT-1 1/2 CIR J346H

## (undated) DEVICE — PAD,ABDOMINAL,8"X10",ST,LF: Brand: MEDLINE

## (undated) DEVICE — DRESSING TELFA STRL 3X6

## (undated) DEVICE — ASTOUND STANDARD SURGICAL GOWN, XL: Brand: CONVERTORS

## (undated) DEVICE — SUTURE VCRL SZ 2-0 L27IN ABSRB VLT L36MM CT-1 1/2 CIR J339H